# Patient Record
Sex: FEMALE | Race: WHITE | URBAN - METROPOLITAN AREA
[De-identification: names, ages, dates, MRNs, and addresses within clinical notes are randomized per-mention and may not be internally consistent; named-entity substitution may affect disease eponyms.]

---

## 2017-01-07 ENCOUNTER — EMERGENCY (EMERGENCY)
Facility: HOSPITAL | Age: 32
LOS: 1 days | Discharge: HOME | End: 2017-01-07
Admitting: EMERGENCY MEDICINE
Payer: MEDICAID

## 2017-01-07 DIAGNOSIS — O26.92 PREGNANCY RELATED CONDITIONS, UNSPECIFIED, SECOND TRIMESTER: ICD-10-CM

## 2017-01-07 DIAGNOSIS — R10.2 PELVIC AND PERINEAL PAIN: ICD-10-CM

## 2017-01-07 DIAGNOSIS — Z3A.16 16 WEEKS GESTATION OF PREGNANCY: ICD-10-CM

## 2017-01-07 DIAGNOSIS — R10.30 LOWER ABDOMINAL PAIN, UNSPECIFIED: ICD-10-CM

## 2017-01-07 PROCEDURE — 76815 OB US LIMITED FETUS(S): CPT | Mod: 26

## 2017-01-07 PROCEDURE — 99284 EMERGENCY DEPT VISIT MOD MDM: CPT

## 2017-03-22 ENCOUNTER — OUTPATIENT (OUTPATIENT)
Dept: INPATIENT UNIT | Facility: HOSPITAL | Age: 32
LOS: 1 days | Discharge: HOME | End: 2017-03-22
Payer: MEDICAID

## 2017-03-22 VITALS
DIASTOLIC BLOOD PRESSURE: 78 MMHG | HEART RATE: 120 BPM | RESPIRATION RATE: 18 BRPM | OXYGEN SATURATION: 97 % | TEMPERATURE: 99 F | SYSTOLIC BLOOD PRESSURE: 132 MMHG

## 2017-03-22 LAB
ALBUMIN SERPL ELPH-MCNC: 2.7 G/DL — LOW (ref 3.3–5)
ALP SERPL-CCNC: 84 U/L — SIGNIFICANT CHANGE UP (ref 40–120)
ALT FLD-CCNC: 21 U/L — SIGNIFICANT CHANGE UP (ref 12–78)
ANION GAP SERPL CALC-SCNC: 14 MMOL/L — SIGNIFICANT CHANGE UP (ref 5–17)
APPEARANCE UR: CLEAR — SIGNIFICANT CHANGE UP
APPEARANCE UR: CLEAR — SIGNIFICANT CHANGE UP
APTT BLD: 28.2 SEC — SIGNIFICANT CHANGE UP (ref 27.5–37.4)
AST SERPL-CCNC: 17 U/L — SIGNIFICANT CHANGE UP (ref 15–37)
BACTERIA # UR AUTO: (no result)
BACTERIA # UR AUTO: (no result)
BASOPHILS # BLD AUTO: 0.1 K/UL — SIGNIFICANT CHANGE UP (ref 0–0.2)
BASOPHILS # BLD AUTO: 0.1 K/UL — SIGNIFICANT CHANGE UP (ref 0–0.2)
BASOPHILS NFR BLD AUTO: 0.3 % — SIGNIFICANT CHANGE UP (ref 0–2)
BILIRUB SERPL-MCNC: 0.5 MG/DL — SIGNIFICANT CHANGE UP (ref 0.2–1.2)
BILIRUB UR-MCNC: NEGATIVE — SIGNIFICANT CHANGE UP
BILIRUB UR-MCNC: NEGATIVE — SIGNIFICANT CHANGE UP
BLD GP AB SCN SERPL QL: SIGNIFICANT CHANGE UP
BUN SERPL-MCNC: 4 MG/DL — LOW (ref 7–23)
CALCIUM SERPL-MCNC: 8.6 MG/DL — SIGNIFICANT CHANGE UP (ref 8.5–10.1)
CHLORIDE SERPL-SCNC: 110 MMOL/L — HIGH (ref 96–108)
CO2 SERPL-SCNC: 20 MMOL/L — LOW (ref 22–31)
COLOR SPEC: YELLOW — SIGNIFICANT CHANGE UP
COLOR SPEC: YELLOW — SIGNIFICANT CHANGE UP
CREAT SERPL-MCNC: 0.5 MG/DL — SIGNIFICANT CHANGE UP (ref 0.5–1.3)
DIFF PNL FLD: (no result)
DIFF PNL FLD: (no result)
EOSINOPHIL # BLD AUTO: 0 K/UL — SIGNIFICANT CHANGE UP (ref 0–0.5)
EOSINOPHIL # BLD AUTO: 0 K/UL — SIGNIFICANT CHANGE UP (ref 0–0.5)
EOSINOPHIL NFR BLD AUTO: 0.1 % — SIGNIFICANT CHANGE UP (ref 0–6)
EPI CELLS # UR: SIGNIFICANT CHANGE UP
EPI CELLS # UR: SIGNIFICANT CHANGE UP
FIBRINOGEN PPP-MCNC: 630 MG/DL — HIGH (ref 310–510)
GLUCOSE SERPL-MCNC: 97 MG/DL — SIGNIFICANT CHANGE UP (ref 70–99)
GLUCOSE UR QL: NEGATIVE MG/DL — SIGNIFICANT CHANGE UP
GLUCOSE UR QL: NEGATIVE MG/DL — SIGNIFICANT CHANGE UP
HCT VFR BLD CALC: 32.6 % — LOW (ref 34.5–45)
HCT VFR BLD CALC: 36.8 % — SIGNIFICANT CHANGE UP (ref 34.5–45)
HGB BLD-MCNC: 11 G/DL — LOW (ref 11.5–15.5)
HGB BLD-MCNC: 12.4 G/DL — SIGNIFICANT CHANGE UP (ref 11.5–15.5)
INR BLD: 1.04 RATIO — SIGNIFICANT CHANGE UP (ref 0.88–1.16)
KETONES UR-MCNC: (no result)
KETONES UR-MCNC: NEGATIVE — SIGNIFICANT CHANGE UP
LACTATE SERPL-SCNC: 1.3 MMOL/L — SIGNIFICANT CHANGE UP (ref 0.7–2)
LACTATE SERPL-SCNC: 2 MMOL/L — SIGNIFICANT CHANGE UP (ref 0.7–2)
LEUKOCYTE ESTERASE UR-ACNC: (no result)
LEUKOCYTE ESTERASE UR-ACNC: NEGATIVE — SIGNIFICANT CHANGE UP
LYMPHOCYTES # BLD AUTO: 1.5 K/UL — SIGNIFICANT CHANGE UP (ref 1–3.3)
LYMPHOCYTES # BLD AUTO: 1.9 K/UL — SIGNIFICANT CHANGE UP (ref 1–3.3)
LYMPHOCYTES # BLD AUTO: 4 % — LOW (ref 13–44)
LYMPHOCYTES # BLD AUTO: 7.8 % — LOW (ref 13–44)
MANUAL DIF COMMENT BLD-IMP: SIGNIFICANT CHANGE UP
MCHC RBC-ENTMCNC: 28.6 PG — SIGNIFICANT CHANGE UP (ref 27–34)
MCHC RBC-ENTMCNC: 28.7 PG — SIGNIFICANT CHANGE UP (ref 27–34)
MCHC RBC-ENTMCNC: 33.7 GM/DL — SIGNIFICANT CHANGE UP (ref 32–36)
MCHC RBC-ENTMCNC: 33.8 GM/DL — SIGNIFICANT CHANGE UP (ref 32–36)
MCV RBC AUTO: 84.7 FL — SIGNIFICANT CHANGE UP (ref 80–100)
MCV RBC AUTO: 84.9 FL — SIGNIFICANT CHANGE UP (ref 80–100)
MONOCYTES # BLD AUTO: 1.3 K/UL — HIGH (ref 0–0.9)
MONOCYTES # BLD AUTO: 1.9 K/UL — HIGH (ref 0–0.9)
MONOCYTES NFR BLD AUTO: 5.4 % — SIGNIFICANT CHANGE UP (ref 2–14)
MONOCYTES NFR BLD AUTO: 7 % — SIGNIFICANT CHANGE UP (ref 2–14)
NEUTROPHILS # BLD AUTO: 21.5 K/UL — HIGH (ref 1.8–7.4)
NEUTROPHILS # BLD AUTO: 23.9 K/UL — HIGH (ref 1.8–7.4)
NEUTROPHILS NFR BLD AUTO: 75 % — SIGNIFICANT CHANGE UP (ref 43–77)
NEUTROPHILS NFR BLD AUTO: 86.4 % — HIGH (ref 43–77)
NEUTS BAND # BLD: 13 % — HIGH (ref 0–8)
NITRITE UR-MCNC: NEGATIVE — SIGNIFICANT CHANGE UP
NITRITE UR-MCNC: NEGATIVE — SIGNIFICANT CHANGE UP
PH UR: 7 — SIGNIFICANT CHANGE UP (ref 4.8–8)
PH UR: 8 — SIGNIFICANT CHANGE UP (ref 4.8–8)
PLAT MORPH BLD: NORMAL — SIGNIFICANT CHANGE UP
PLAT MORPH BLD: NORMAL — SIGNIFICANT CHANGE UP
PLATELET # BLD AUTO: 243 K/UL — SIGNIFICANT CHANGE UP (ref 150–400)
PLATELET # BLD AUTO: 270 K/UL — SIGNIFICANT CHANGE UP (ref 150–400)
POLYCHROMASIA BLD QL SMEAR: SLIGHT — SIGNIFICANT CHANGE UP
POTASSIUM SERPL-MCNC: 3.3 MMOL/L — LOW (ref 3.5–5.3)
POTASSIUM SERPL-SCNC: 3.3 MMOL/L — LOW (ref 3.5–5.3)
PROT SERPL-MCNC: 6.9 GM/DL — SIGNIFICANT CHANGE UP (ref 6–8.3)
PROT UR-MCNC: NEGATIVE MG/DL — SIGNIFICANT CHANGE UP
PROT UR-MCNC: NEGATIVE MG/DL — SIGNIFICANT CHANGE UP
PROTHROM AB SERPL-ACNC: 11.2 SEC — SIGNIFICANT CHANGE UP (ref 9.8–12.7)
RBC # BLD: 3.84 M/UL — SIGNIFICANT CHANGE UP (ref 3.8–5.2)
RBC # BLD: 4.33 M/UL — SIGNIFICANT CHANGE UP (ref 3.8–5.2)
RBC # FLD: 12.8 % — SIGNIFICANT CHANGE UP (ref 10.3–14.5)
RBC # FLD: 12.9 % — SIGNIFICANT CHANGE UP (ref 10.3–14.5)
RBC BLD AUTO: SIGNIFICANT CHANGE UP
RBC BLD AUTO: SIGNIFICANT CHANGE UP
RBC CASTS # UR COMP ASSIST: (no result) /HPF (ref 0–4)
RBC CASTS # UR COMP ASSIST: (no result) /HPF (ref 0–4)
SODIUM SERPL-SCNC: 144 MMOL/L — SIGNIFICANT CHANGE UP (ref 135–145)
SP GR SPEC: 1 — LOW (ref 1.01–1.02)
SP GR SPEC: 1.01 — SIGNIFICANT CHANGE UP (ref 1.01–1.02)
TSH SERPL-MCNC: 1.39 UIU/ML — SIGNIFICANT CHANGE UP (ref 0.36–3.74)
TYPE + AB SCN PNL BLD: SIGNIFICANT CHANGE UP
UROBILINOGEN FLD QL: NEGATIVE MG/DL — SIGNIFICANT CHANGE UP
UROBILINOGEN FLD QL: NEGATIVE MG/DL — SIGNIFICANT CHANGE UP
VARIANT LYMPHS # BLD: 1 % — SIGNIFICANT CHANGE UP (ref 0–6)
WBC # BLD: 24.9 K/UL — HIGH (ref 3.8–10.5)
WBC # BLD: 27.4 K/UL — HIGH (ref 3.8–10.5)
WBC # FLD AUTO: 24.9 K/UL — HIGH (ref 3.8–10.5)
WBC # FLD AUTO: 27.4 K/UL — HIGH (ref 3.8–10.5)
WBC UR QL: (no result)
WBC UR QL: SIGNIFICANT CHANGE UP

## 2017-03-22 PROCEDURE — 76815 OB US LIMITED FETUS(S): CPT | Mod: 26

## 2017-03-22 RX ORDER — CITRIC ACID/SODIUM CITRATE 300-500 MG
15 SOLUTION, ORAL ORAL EVERY 4 HOURS
Qty: 0 | Refills: 0 | Status: DISCONTINUED | OUTPATIENT
Start: 2017-03-22 | End: 2017-03-22

## 2017-03-22 RX ORDER — SODIUM CHLORIDE 9 MG/ML
500 INJECTION, SOLUTION INTRAVENOUS ONCE
Qty: 0 | Refills: 0 | Status: DISCONTINUED | OUTPATIENT
Start: 2017-03-22 | End: 2017-03-22

## 2017-03-22 RX ORDER — HYDROCORTISONE 1 %
1 OINTMENT (GRAM) TOPICAL EVERY 4 HOURS
Qty: 0 | Refills: 0 | Status: DISCONTINUED | OUTPATIENT
Start: 2017-03-22 | End: 2017-04-06

## 2017-03-22 RX ORDER — AER TRAVELER 0.5 G/1
1 SOLUTION RECTAL; TOPICAL EVERY 4 HOURS
Qty: 0 | Refills: 0 | Status: DISCONTINUED | OUTPATIENT
Start: 2017-03-22 | End: 2017-04-06

## 2017-03-22 RX ORDER — GENTAMICIN SULFATE 40 MG/ML
120 VIAL (ML) INJECTION ONCE
Qty: 0 | Refills: 0 | Status: COMPLETED | OUTPATIENT
Start: 2017-03-22 | End: 2017-03-22

## 2017-03-22 RX ORDER — AMPICILLIN TRIHYDRATE 250 MG
2 CAPSULE ORAL EVERY 6 HOURS
Qty: 0 | Refills: 0 | Status: DISCONTINUED | OUTPATIENT
Start: 2017-03-23 | End: 2017-03-27

## 2017-03-22 RX ORDER — CARBOPROST TROMETHAMINE 250 UG/ML
250 INJECTION, SOLUTION INTRAMUSCULAR ONCE
Qty: 0 | Refills: 0 | Status: COMPLETED | OUTPATIENT
Start: 2017-03-22 | End: 2017-03-22

## 2017-03-22 RX ORDER — DIBUCAINE 1 %
1 OINTMENT (GRAM) RECTAL EVERY 4 HOURS
Qty: 0 | Refills: 0 | Status: DISCONTINUED | OUTPATIENT
Start: 2017-03-22 | End: 2017-04-06

## 2017-03-22 RX ORDER — SODIUM CHLORIDE 9 MG/ML
1000 INJECTION, SOLUTION INTRAVENOUS
Qty: 0 | Refills: 0 | Status: DISCONTINUED | OUTPATIENT
Start: 2017-03-22 | End: 2017-03-23

## 2017-03-22 RX ORDER — GLYCERIN ADULT
1 SUPPOSITORY, RECTAL RECTAL AT BEDTIME
Qty: 0 | Refills: 0 | Status: DISCONTINUED | OUTPATIENT
Start: 2017-03-22 | End: 2017-04-06

## 2017-03-22 RX ORDER — SODIUM CHLORIDE 9 MG/ML
3 INJECTION INTRAMUSCULAR; INTRAVENOUS; SUBCUTANEOUS EVERY 8 HOURS
Qty: 0 | Refills: 0 | Status: DISCONTINUED | OUTPATIENT
Start: 2017-03-22 | End: 2017-04-06

## 2017-03-22 RX ORDER — PRAMOXINE HYDROCHLORIDE 150 MG/15G
1 AEROSOL, FOAM RECTAL EVERY 4 HOURS
Qty: 0 | Refills: 0 | Status: DISCONTINUED | OUTPATIENT
Start: 2017-03-22 | End: 2017-04-06

## 2017-03-22 RX ORDER — LANOLIN
1 OINTMENT (GRAM) TOPICAL EVERY 6 HOURS
Qty: 0 | Refills: 0 | Status: DISCONTINUED | OUTPATIENT
Start: 2017-03-22 | End: 2017-04-06

## 2017-03-22 RX ORDER — MAGNESIUM HYDROXIDE 400 MG/1
30 TABLET, CHEWABLE ORAL
Qty: 0 | Refills: 0 | Status: DISCONTINUED | OUTPATIENT
Start: 2017-03-22 | End: 2017-04-06

## 2017-03-22 RX ORDER — DIPHENHYDRAMINE HCL 50 MG
25 CAPSULE ORAL EVERY 6 HOURS
Qty: 0 | Refills: 0 | Status: DISCONTINUED | OUTPATIENT
Start: 2017-03-22 | End: 2017-04-06

## 2017-03-22 RX ORDER — GENTAMICIN SULFATE 40 MG/ML
80 VIAL (ML) INJECTION EVERY 8 HOURS
Qty: 0 | Refills: 0 | Status: DISCONTINUED | OUTPATIENT
Start: 2017-03-22 | End: 2017-03-27

## 2017-03-22 RX ORDER — BUTORPHANOL TARTRATE 2 MG/ML
2 INJECTION, SOLUTION INTRAMUSCULAR; INTRAVENOUS ONCE
Qty: 0 | Refills: 0 | Status: DISCONTINUED | OUTPATIENT
Start: 2017-03-22 | End: 2017-03-22

## 2017-03-22 RX ORDER — ACETAMINOPHEN 500 MG
975 TABLET ORAL EVERY 6 HOURS
Qty: 0 | Refills: 0 | Status: DISCONTINUED | OUTPATIENT
Start: 2017-03-22 | End: 2017-04-06

## 2017-03-22 RX ORDER — OXYTOCIN 10 UNIT/ML
41.67 VIAL (ML) INJECTION
Qty: 20 | Refills: 0 | Status: DISCONTINUED | OUTPATIENT
Start: 2017-03-22 | End: 2017-04-06

## 2017-03-22 RX ORDER — AMPICILLIN TRIHYDRATE 250 MG
2 CAPSULE ORAL ONCE
Qty: 0 | Refills: 0 | Status: COMPLETED | OUTPATIENT
Start: 2017-03-22 | End: 2017-03-22

## 2017-03-22 RX ORDER — DOCUSATE SODIUM 100 MG
100 CAPSULE ORAL
Qty: 0 | Refills: 0 | Status: DISCONTINUED | OUTPATIENT
Start: 2017-03-22 | End: 2017-04-06

## 2017-03-22 RX ORDER — GENTAMICIN SULFATE 40 MG/ML
VIAL (ML) INJECTION
Qty: 0 | Refills: 0 | Status: DISCONTINUED | OUTPATIENT
Start: 2017-03-22 | End: 2017-03-27

## 2017-03-22 RX ORDER — OXYTOCIN 10 UNIT/ML
333.33 VIAL (ML) INJECTION
Qty: 20 | Refills: 0 | Status: DISCONTINUED | OUTPATIENT
Start: 2017-03-22 | End: 2017-03-23

## 2017-03-22 RX ORDER — AMPICILLIN TRIHYDRATE 250 MG
CAPSULE ORAL
Qty: 0 | Refills: 0 | Status: DISCONTINUED | OUTPATIENT
Start: 2017-03-22 | End: 2017-03-27

## 2017-03-22 RX ORDER — SIMETHICONE 80 MG/1
80 TABLET, CHEWABLE ORAL EVERY 6 HOURS
Qty: 0 | Refills: 0 | Status: DISCONTINUED | OUTPATIENT
Start: 2017-03-22 | End: 2017-04-06

## 2017-03-22 RX ORDER — IBUPROFEN 200 MG
600 TABLET ORAL EVERY 6 HOURS
Qty: 0 | Refills: 0 | Status: DISCONTINUED | OUTPATIENT
Start: 2017-03-22 | End: 2017-04-06

## 2017-03-22 RX ORDER — SODIUM CHLORIDE 9 MG/ML
500 INJECTION INTRAMUSCULAR; INTRAVENOUS; SUBCUTANEOUS
Qty: 0 | Refills: 0 | Status: DISCONTINUED | OUTPATIENT
Start: 2017-03-22 | End: 2017-04-06

## 2017-03-22 RX ADMIN — SODIUM CHLORIDE 2000 MILLILITER(S): 9 INJECTION INTRAMUSCULAR; INTRAVENOUS; SUBCUTANEOUS at 19:04

## 2017-03-22 RX ADMIN — Medication 125 MILLIUNIT(S)/MIN: at 20:47

## 2017-03-22 RX ADMIN — Medication 216 GRAM(S): at 17:53

## 2017-03-22 RX ADMIN — Medication 975 MILLIGRAM(S): at 17:37

## 2017-03-22 RX ADMIN — Medication 125 MILLIUNIT(S)/MIN: at 20:46

## 2017-03-22 RX ADMIN — SODIUM CHLORIDE 125 MILLILITER(S): 9 INJECTION, SOLUTION INTRAVENOUS at 19:01

## 2017-03-22 RX ADMIN — BUTORPHANOL TARTRATE 2 MILLIGRAM(S): 2 INJECTION, SOLUTION INTRAMUSCULAR; INTRAVENOUS at 17:38

## 2017-03-22 RX ADMIN — CARBOPROST TROMETHAMINE 250 MICROGRAM(S): 250 INJECTION, SOLUTION INTRAMUSCULAR at 19:48

## 2017-03-22 RX ADMIN — Medication 200 MILLIGRAM(S): at 18:33

## 2017-03-22 NOTE — PROGRESS NOTE ADULT - SUBJECTIVE AND OBJECTIVE BOX
CODE SEPSIS note  ICU resdient note  31 yo F @26 weeks. presented to  today with fever and fetal demise.  pt was started on IV fluids and IV ampicillin, IV gentamycin pending.  Code sepsis was called by nursing staff due to fever.  pt seen and examined, febrile, c/o some dysuria, 1 episode of vomiting earlier today, 4 episodes of diarrhea, lower abdominal pain.  Physical examination:  T 102.2  /75 RR 12  CV: s1s2nl, no m/r/g  Pulm: CTA B/L  Abd: gravid, lower abdomen tenderness, no rebound or guarding    A/P  Case d/w MD Pina1.  m/p chorioamnionitis in setting of fetal demise. Continue management as per GYN: Ampi+genta and IV fluids  Labs and Bcx, Ucx sent  Not a candidate for ICU admission at the moment    d/w Dr. Benites

## 2017-03-23 VITALS
TEMPERATURE: 98 F | DIASTOLIC BLOOD PRESSURE: 88 MMHG | RESPIRATION RATE: 20 BRPM | SYSTOLIC BLOOD PRESSURE: 126 MMHG | HEART RATE: 86 BPM

## 2017-03-23 LAB
ANISOCYTOSIS BLD QL: SLIGHT — SIGNIFICANT CHANGE UP
AT III ACT/NOR PPP CHRO: 77 % — LOW (ref 85–135)
AT III AG PPP IA-MCNC: 19 MG/DL — LOW (ref 22–39)
CULTURE RESULTS: NO GROWTH — SIGNIFICANT CHANGE UP
DRVVT SCREEN TO CONFIRM RATIO: SIGNIFICANT CHANGE UP
EOSINOPHIL NFR BLD AUTO: 1 % — SIGNIFICANT CHANGE UP (ref 0–6)
HCT VFR BLD CALC: 31.3 % — LOW (ref 34.5–45)
HGB BLD-MCNC: 10.4 G/DL — LOW (ref 11.5–15.5)
LA NT DPL PPP QL: 32.4 SEC — SIGNIFICANT CHANGE UP
LYMPHOCYTES # BLD AUTO: 10 % — LOW (ref 13–44)
MANUAL DIF COMMENT BLD-IMP: SIGNIFICANT CHANGE UP
MCHC RBC-ENTMCNC: 28.5 PG — SIGNIFICANT CHANGE UP (ref 27–34)
MCHC RBC-ENTMCNC: 33.3 GM/DL — SIGNIFICANT CHANGE UP (ref 32–36)
MCV RBC AUTO: 85.5 FL — SIGNIFICANT CHANGE UP (ref 80–100)
MICROCYTES BLD QL: SLIGHT — SIGNIFICANT CHANGE UP
MONOCYTES NFR BLD AUTO: 4 % — SIGNIFICANT CHANGE UP (ref 2–14)
MYELOCYTES NFR BLD: 1 % — HIGH (ref 0–0)
NEUTROPHILS NFR BLD AUTO: 74 % — SIGNIFICANT CHANGE UP (ref 43–77)
NEUTS BAND # BLD: 9 % — HIGH (ref 0–8)
NORMALIZED SCT PPP-RTO: 0.85 RATIO — SIGNIFICANT CHANGE UP (ref 0–1.16)
NORMALIZED SCT PPP-RTO: SIGNIFICANT CHANGE UP
PLAT MORPH BLD: NORMAL — SIGNIFICANT CHANGE UP
PLATELET # BLD AUTO: 250 K/UL — SIGNIFICANT CHANGE UP (ref 150–400)
POIKILOCYTOSIS BLD QL AUTO: SLIGHT — SIGNIFICANT CHANGE UP
POLYCHROMASIA BLD QL SMEAR: SLIGHT — SIGNIFICANT CHANGE UP
PROT C ACT/NOR PPP: 80 % — SIGNIFICANT CHANGE UP (ref 74–150)
PROT S FREE AG PPP IA-ACNC: 21 % — LOW (ref 61–131)
RBC # BLD: 3.66 M/UL — LOW (ref 3.8–5.2)
RBC # FLD: 12.9 % — SIGNIFICANT CHANGE UP (ref 10.3–14.5)
RBC BLD AUTO: (no result)
SPECIMEN SOURCE: SIGNIFICANT CHANGE UP
T PALLIDUM AB TITR SER: NEGATIVE — SIGNIFICANT CHANGE UP
T4 AB SER-ACNC: 10.9 UG/DL — SIGNIFICANT CHANGE UP (ref 4.6–12)
VARIANT LYMPHS # BLD: 1 % — SIGNIFICANT CHANGE UP (ref 0–6)
WBC # BLD: 29 K/UL — HIGH (ref 3.8–10.5)
WBC # FLD AUTO: 29 K/UL — HIGH (ref 3.8–10.5)

## 2017-03-23 RX ADMIN — Medication 216 GRAM(S): at 11:50

## 2017-03-23 RX ADMIN — Medication 200 MILLIGRAM(S): at 07:00

## 2017-03-23 RX ADMIN — Medication 200 MILLIGRAM(S): at 14:15

## 2017-03-23 RX ADMIN — Medication 216 GRAM(S): at 06:23

## 2017-03-23 NOTE — DISCHARGE NOTE OB - HOSPITAL COURSE
33 yo  at 26 weeks gestation presented at her OB's office c/o back pain and malaise.  Pt was found to be febrile, with left CVAT, and no fetal cardiac activity was found. 33 yo  at 26 weeks gestation presented at her OB's office c/o back pain and malaise.  Pt was found to be febrile, with left CVAT, and no fetal cardiac activity was found.  Pt was sent to Labor and Delivery with plan to treat infection and to induce labor.  Pt was noted on L+D to be dilated to 4cm already with bulging membranes.  Pt progressed into spontaneous labor.  Pt also underwent full evaluation for sepsis due to tachycardia, fever upto 102.7, leukocytosis of 24K.  Subsequently, pt delivered vaginally to a normal appearing 26 week fetus, nonmacerated, nonmalodorus, with normal appearing placenta, devoid of abnormalities.  The cord was noted to be tightly wrapped around the fetal neck and multiple times around the body.  The fetus appeared grossly normal with no malformations.  Pt continued on ampicillin and gentamicin from intrapartum to postpartum period.    Pt remained afebrile after delivery into postpartum day 1.  Counseling and support was given.  Social work consultation was performed.  Pt was desirous of discharge home on postpartum day 1, acknowledging her need to continue on her antibiotics at home.

## 2017-03-23 NOTE — DISCHARGE NOTE OB - MEDICATION SUMMARY - MEDICATIONS TO STOP TAKING
I will STOP taking the medications listed below when I get home from the hospital:    amoxicillin-clavulanate 875 mg-125 mg oral tablet  -- 1 tab(s) by mouth 2 times a day  -- Finish all this medication unless otherwise directed by prescriber.  Take with food or milk.

## 2017-03-23 NOTE — DISCHARGE NOTE OB - MEDICATION SUMMARY - MEDICATIONS TO TAKE
I will START or STAY ON the medications listed below when I get home from the hospital:    amoxicillin-clavulanate 875 mg-125 mg oral tablet  -- 1 tab(s) by mouth 2 times a day  -- Finish all this medication unless otherwise directed by prescriber.  Take with food or milk.    -- Indication: For pyelonephritis

## 2017-03-23 NOTE — DISCHARGE NOTE OB - PATIENT PORTAL LINK FT
“You can access the FollowHealth Patient Portal, offered by [facility], by registering with the following website: [email or web address]”

## 2017-03-23 NOTE — DISCHARGE NOTE OB - CARE PLAN
Principal Discharge DX:	Fetal demise, greater than 22 weeks, antepartum, fetus 1  Goal:	recovery  Instructions for follow-up, activity and diet:	pelvic rest for six weeks.  Activity as tolerated.  Regular diet.

## 2017-03-23 NOTE — DISCHARGE NOTE OB - CARE PROVIDER_API CALL
Javier Tiwari (DO), Obstetrics and Gynecology  [address] [place] [address]  Phone: [phone]  Fax: [phone]

## 2017-03-23 NOTE — PERINATAL/NEONATAL BEREAVEMENT NOTE - NS PERI BEREAVE CONSENT PATHO
COPY WITH BABY IN [facility], MOTHERS CHART AND COPY NURSING ADMINISTRATION/no

## 2017-03-24 LAB
ANA PAT FLD IF-IMP: (no result)
ANA TITR SER: (no result)
B2 GLYCOPROT1 AB SER QL: NEGATIVE — SIGNIFICANT CHANGE UP
CULTURE RESULTS: SIGNIFICANT CHANGE UP
SPECIMEN SOURCE: SIGNIFICANT CHANGE UP

## 2017-03-26 ENCOUNTER — RESULT REVIEW (OUTPATIENT)
Age: 32
End: 2017-03-26

## 2017-03-27 PROCEDURE — 88307 TISSUE EXAM BY PATHOLOGIST: CPT | Mod: 26

## 2017-03-28 LAB
CULTURE RESULTS: SIGNIFICANT CHANGE UP
CULTURE RESULTS: SIGNIFICANT CHANGE UP
SPECIMEN SOURCE: SIGNIFICANT CHANGE UP
SPECIMEN SOURCE: SIGNIFICANT CHANGE UP

## 2017-03-29 DIAGNOSIS — O36.4XX0 MATERNAL CARE FOR INTRAUTERINE DEATH, NOT APPLICABLE OR UNSPECIFIED: ICD-10-CM

## 2017-03-30 LAB — SURGICAL PATHOLOGY STUDY: SIGNIFICANT CHANGE UP

## 2017-04-04 ENCOUNTER — EMERGENCY (EMERGENCY)
Facility: HOSPITAL | Age: 32
LOS: 0 days | Discharge: HOME | End: 2017-04-04
Attending: EMERGENCY MEDICINE | Admitting: EMERGENCY MEDICINE
Payer: MEDICAID

## 2017-04-04 VITALS
DIASTOLIC BLOOD PRESSURE: 72 MMHG | TEMPERATURE: 98 F | SYSTOLIC BLOOD PRESSURE: 104 MMHG | HEART RATE: 69 BPM | RESPIRATION RATE: 18 BRPM | OXYGEN SATURATION: 99 %

## 2017-04-04 VITALS
DIASTOLIC BLOOD PRESSURE: 97 MMHG | TEMPERATURE: 98 F | HEART RATE: 77 BPM | OXYGEN SATURATION: 100 % | RESPIRATION RATE: 16 BRPM | SYSTOLIC BLOOD PRESSURE: 142 MMHG

## 2017-04-04 LAB
ALBUMIN SERPL ELPH-MCNC: 3.4 G/DL — SIGNIFICANT CHANGE UP (ref 3.3–5)
ALP SERPL-CCNC: 87 U/L — SIGNIFICANT CHANGE UP (ref 40–120)
ALT FLD-CCNC: 23 U/L — SIGNIFICANT CHANGE UP (ref 12–78)
ANION GAP SERPL CALC-SCNC: 10 MMOL/L — SIGNIFICANT CHANGE UP (ref 5–17)
APPEARANCE UR: CLEAR — SIGNIFICANT CHANGE UP
AST SERPL-CCNC: 16 U/L — SIGNIFICANT CHANGE UP (ref 15–37)
BACTERIA # UR AUTO: (no result)
BASOPHILS # BLD AUTO: 0.1 K/UL — SIGNIFICANT CHANGE UP (ref 0–0.2)
BASOPHILS NFR BLD AUTO: 1 % — SIGNIFICANT CHANGE UP (ref 0–2)
BILIRUB SERPL-MCNC: 0.1 MG/DL — LOW (ref 0.2–1.2)
BILIRUB UR-MCNC: NEGATIVE — SIGNIFICANT CHANGE UP
BUN SERPL-MCNC: 14 MG/DL — SIGNIFICANT CHANGE UP (ref 7–23)
CALCIUM SERPL-MCNC: 9 MG/DL — SIGNIFICANT CHANGE UP (ref 8.5–10.1)
CHLORIDE SERPL-SCNC: 106 MMOL/L — SIGNIFICANT CHANGE UP (ref 96–108)
CO2 SERPL-SCNC: 25 MMOL/L — SIGNIFICANT CHANGE UP (ref 22–31)
COLOR SPEC: YELLOW — SIGNIFICANT CHANGE UP
CREAT SERPL-MCNC: 0.78 MG/DL — SIGNIFICANT CHANGE UP (ref 0.5–1.3)
DIFF PNL FLD: (no result)
EOSINOPHIL # BLD AUTO: 0.3 K/UL — SIGNIFICANT CHANGE UP (ref 0–0.5)
EOSINOPHIL NFR BLD AUTO: 3 % — SIGNIFICANT CHANGE UP (ref 0–6)
EPI CELLS # UR: SIGNIFICANT CHANGE UP
GLUCOSE SERPL-MCNC: 90 MG/DL — SIGNIFICANT CHANGE UP (ref 70–99)
GLUCOSE UR QL: NEGATIVE MG/DL — SIGNIFICANT CHANGE UP
HCT VFR BLD CALC: 39.7 % — SIGNIFICANT CHANGE UP (ref 34.5–45)
HGB BLD-MCNC: 13.1 G/DL — SIGNIFICANT CHANGE UP (ref 11.5–15.5)
KETONES UR-MCNC: NEGATIVE — SIGNIFICANT CHANGE UP
LEUKOCYTE ESTERASE UR-ACNC: (no result)
LYMPHOCYTES # BLD AUTO: 4.2 K/UL — HIGH (ref 1–3.3)
LYMPHOCYTES # BLD AUTO: 40.6 % — SIGNIFICANT CHANGE UP (ref 13–44)
MCHC RBC-ENTMCNC: 27.9 PG — SIGNIFICANT CHANGE UP (ref 27–34)
MCHC RBC-ENTMCNC: 33.1 GM/DL — SIGNIFICANT CHANGE UP (ref 32–36)
MCV RBC AUTO: 84.3 FL — SIGNIFICANT CHANGE UP (ref 80–100)
MONOCYTES # BLD AUTO: 0.5 K/UL — SIGNIFICANT CHANGE UP (ref 0–0.9)
MONOCYTES NFR BLD AUTO: 5.3 % — SIGNIFICANT CHANGE UP (ref 2–14)
NEUTROPHILS # BLD AUTO: 5.2 K/UL — SIGNIFICANT CHANGE UP (ref 1.8–7.4)
NEUTROPHILS NFR BLD AUTO: 50.2 % — SIGNIFICANT CHANGE UP (ref 43–77)
NITRITE UR-MCNC: NEGATIVE — SIGNIFICANT CHANGE UP
PH UR: 6.5 — SIGNIFICANT CHANGE UP (ref 4.8–8)
PLATELET # BLD AUTO: 413 K/UL — HIGH (ref 150–400)
POTASSIUM SERPL-MCNC: 3.7 MMOL/L — SIGNIFICANT CHANGE UP (ref 3.5–5.3)
POTASSIUM SERPL-SCNC: 3.7 MMOL/L — SIGNIFICANT CHANGE UP (ref 3.5–5.3)
PROT SERPL-MCNC: 7.8 GM/DL — SIGNIFICANT CHANGE UP (ref 6–8.3)
PROT UR-MCNC: 15 MG/DL
RBC # BLD: 4.71 M/UL — SIGNIFICANT CHANGE UP (ref 3.8–5.2)
RBC # FLD: 12.6 % — SIGNIFICANT CHANGE UP (ref 10.3–14.5)
RBC CASTS # UR COMP ASSIST: (no result) /HPF (ref 0–4)
SODIUM SERPL-SCNC: 141 MMOL/L — SIGNIFICANT CHANGE UP (ref 135–145)
SP GR SPEC: 1.01 — SIGNIFICANT CHANGE UP (ref 1.01–1.02)
TROPONIN I SERPL-MCNC: <0.015 NG/ML — SIGNIFICANT CHANGE UP (ref 0.01–0.04)
TROPONIN I SERPL-MCNC: <0.015 NG/ML — SIGNIFICANT CHANGE UP (ref 0.01–0.04)
UROBILINOGEN FLD QL: NEGATIVE MG/DL — SIGNIFICANT CHANGE UP
WBC # BLD: 10.3 K/UL — SIGNIFICANT CHANGE UP (ref 3.8–10.5)
WBC # FLD AUTO: 10.3 K/UL — SIGNIFICANT CHANGE UP (ref 3.8–10.5)
WBC UR QL: (no result)

## 2017-04-04 PROCEDURE — 93010 ELECTROCARDIOGRAM REPORT: CPT

## 2017-04-04 PROCEDURE — 99284 EMERGENCY DEPT VISIT MOD MDM: CPT

## 2017-04-04 PROCEDURE — 71275 CT ANGIOGRAPHY CHEST: CPT | Mod: 26

## 2017-04-04 RX ORDER — SODIUM CHLORIDE 9 MG/ML
1000 INJECTION INTRAMUSCULAR; INTRAVENOUS; SUBCUTANEOUS ONCE
Qty: 0 | Refills: 0 | Status: COMPLETED | OUTPATIENT
Start: 2017-04-04 | End: 2017-04-04

## 2017-04-04 RX ADMIN — SODIUM CHLORIDE 1000 MILLILITER(S): 9 INJECTION INTRAMUSCULAR; INTRAVENOUS; SUBCUTANEOUS at 18:39

## 2017-04-04 NOTE — ED STATDOCS - PROGRESS NOTE DETAILS
32 yr. old female presents to ED with chest pain S/P 15 days post partum onset 3 days ago Reports SOB on exertion. No N/V or abd. pain No recent travel. Seen and examined by attending PMD and oreders were placed. Will F/U with results and re evaluate. Humberto CHONG 32 yr. old female presents to ED with chest pain S/P 15 days post partum onset 3 days ago Reports SOB on exertion. No N/V or abd. pain No recent travel. Seen and examined by attending PMD and orders were placed .Plan; Labs, CT chest, troponin.  Will F/U with results and re evaluate. Humberto CHONG Discussed results using  [id number]. Reevaluated patient at bedside.  Patient feeling much improved.  Discussed the results of all diagnostic testing in ED and copies of all reports given.   An opportunity to ask questions was given.  Discussed the importance of prompt, close medical follow-up.  Patient will return with any changes, concerns or persistent / worsening symptoms.  Understanding of all instructions verbalized. -Velia Knowles PA-C

## 2017-04-04 NOTE — ED STATDOCS - NS ED MD SCRIBE ATTENDING SCRIBE SECTIONS
PROGRESS NOTE/RESULTS/PHYSICAL EXAM/PAST MEDICAL/SURGICAL/SOCIAL HISTORY/DISPOSITION/REVIEW OF SYSTEMS/HISTORY OF PRESENT ILLNESS

## 2017-04-04 NOTE — ED STATDOCS - OBJECTIVE STATEMENT
( Used ID#[id number]) 33 y/o female post partum 15 days after vaginal delivery presents to the ED with  c/o central chest pain that started a few days ago. She states that she has been having SOB on exertion. Pt reports having similar symptoms when she was pregnant. Currently pt has no other complaints and denies abd pain, n/v/d, LE edema and recent travel. PMD. Dr. Garduno.

## 2017-04-04 NOTE — ED STATDOCS - ATTENDING CONTRIBUTION TO CARE
I, Julianne Go MD,  performed the initial face to face bedside interview with this patient regarding history of present illness, review of symptoms and relevant past medical, social and family history.  I completed an independent physical examination.  I was the initial provider who evaluated this patient. I have signed out the follow up of any pending tests (i.e. labs, radiological studies) to the ACP.  I have communicated the patient’s plan of care and disposition with the ACP.  The history, relevant review of systems, past medical and surgical history, medical decision making, and physical examination was documented by the scribe in my presence and I attest to the accuracy of the documentation.

## 2017-04-04 NOTE — ED ADULT TRIAGE NOTE - CHIEF COMPLAINT QUOTE
Pt presents with CP x 1 month. pt devilled a baby 2 weeks ago. pt had CP during pregnancy, and CP never went away. pt also c/o SOB with exertion

## 2017-04-05 DIAGNOSIS — Z90.49 ACQUIRED ABSENCE OF OTHER SPECIFIED PARTS OF DIGESTIVE TRACT: Chronic | ICD-10-CM

## 2017-04-06 DIAGNOSIS — R00.2 PALPITATIONS: ICD-10-CM

## 2017-04-06 DIAGNOSIS — R07.9 CHEST PAIN, UNSPECIFIED: ICD-10-CM

## 2017-06-08 ENCOUNTER — APPOINTMENT (OUTPATIENT)
Dept: ULTRASOUND IMAGING | Facility: CLINIC | Age: 32
End: 2017-06-08

## 2017-06-08 ENCOUNTER — OUTPATIENT (OUTPATIENT)
Dept: OUTPATIENT SERVICES | Facility: HOSPITAL | Age: 32
LOS: 1 days | End: 2017-06-08
Payer: MEDICAID

## 2017-06-08 DIAGNOSIS — Z90.49 ACQUIRED ABSENCE OF OTHER SPECIFIED PARTS OF DIGESTIVE TRACT: Chronic | ICD-10-CM

## 2017-06-08 DIAGNOSIS — Z00.8 ENCOUNTER FOR OTHER GENERAL EXAMINATION: ICD-10-CM

## 2017-06-08 PROCEDURE — 76641 ULTRASOUND BREAST COMPLETE: CPT

## 2017-08-19 ENCOUNTER — EMERGENCY (EMERGENCY)
Facility: HOSPITAL | Age: 32
LOS: 0 days | Discharge: HOME | End: 2017-08-20
Attending: EMERGENCY MEDICINE | Admitting: EMERGENCY MEDICINE
Payer: MEDICAID

## 2017-08-19 VITALS — WEIGHT: 145.06 LBS | HEIGHT: 64 IN

## 2017-08-19 DIAGNOSIS — Z90.49 ACQUIRED ABSENCE OF OTHER SPECIFIED PARTS OF DIGESTIVE TRACT: ICD-10-CM

## 2017-08-19 DIAGNOSIS — R50.9 FEVER, UNSPECIFIED: ICD-10-CM

## 2017-08-19 DIAGNOSIS — B34.9 VIRAL INFECTION, UNSPECIFIED: ICD-10-CM

## 2017-08-19 DIAGNOSIS — Z90.49 ACQUIRED ABSENCE OF OTHER SPECIFIED PARTS OF DIGESTIVE TRACT: Chronic | ICD-10-CM

## 2017-08-19 PROCEDURE — 99284 EMERGENCY DEPT VISIT MOD MDM: CPT | Mod: 25

## 2017-08-19 NOTE — ED ADULT NURSE NOTE - OBJECTIVE STATEMENT
Pt. to the ED BIB  C/O Sore Throat with dry cough since yesterday- Pt. denies CP and SOB- Pt. states she is 6 weeks pregnant and was told by GYN to come to the ED for further evaluation- Pt. states she was febrile at 102.0 at home, but took Tylenol at 1500pm today- Hx. of HTN and Ruptured membranes from prior pregnancy and delivered at 26 weeks- Will cont to monitor pt. closely- Safety maintained

## 2017-08-19 NOTE — ED ADULT NURSE NOTE - CHPI ED SYMPTOMS NEG
no nausea/no loss of consciousness/no blurred vision/no weakness/no numbness/no vomiting/no change in level of consciousness

## 2017-08-20 VITALS
RESPIRATION RATE: 16 BRPM | TEMPERATURE: 101 F | OXYGEN SATURATION: 99 % | DIASTOLIC BLOOD PRESSURE: 64 MMHG | HEART RATE: 105 BPM | SYSTOLIC BLOOD PRESSURE: 118 MMHG

## 2017-08-20 LAB — S PYO AG SPEC QL IA: NEGATIVE — SIGNIFICANT CHANGE UP

## 2017-08-20 RX ORDER — ACETAMINOPHEN 500 MG
650 TABLET ORAL ONCE
Qty: 0 | Refills: 0 | Status: COMPLETED | OUTPATIENT
Start: 2017-08-20 | End: 2017-08-20

## 2017-08-20 RX ADMIN — Medication 650 MILLIGRAM(S): at 01:06

## 2017-08-20 NOTE — ED ADULT NURSE REASSESSMENT NOTE - NS ED NURSE REASSESS COMMENT FT1
Pt. D/C as ordered. No physical distress noted at this time- Low grade temp of 100.8 orally noted, Dr. Irby made aware prior to D/C, Pt. medicated with Tylenol 650mg PO as ordered prior to D/C- Pt. and  educated by RN about worsening of symptoms, any high fevers and to return immediately to the ED- Pt. verbalizes understanding of D/C, F/U and worsening of symptoms- Safety maintained

## 2017-08-22 LAB
CULTURE RESULTS: SIGNIFICANT CHANGE UP
SPECIMEN SOURCE: SIGNIFICANT CHANGE UP

## 2017-09-25 PROBLEM — Z00.00 ENCOUNTER FOR PREVENTIVE HEALTH EXAMINATION: Noted: 2017-09-25

## 2017-09-26 ENCOUNTER — ASOB RESULT (OUTPATIENT)
Age: 32
End: 2017-09-26

## 2017-09-26 ENCOUNTER — APPOINTMENT (OUTPATIENT)
Dept: ANTEPARTUM | Facility: CLINIC | Age: 32
End: 2017-09-26
Payer: MEDICAID

## 2017-09-26 PROCEDURE — 76813 OB US NUCHAL MEAS 1 GEST: CPT

## 2017-10-31 ENCOUNTER — EMERGENCY (EMERGENCY)
Facility: HOSPITAL | Age: 32
LOS: 0 days | Discharge: HOME | End: 2017-11-01
Attending: EMERGENCY MEDICINE | Admitting: EMERGENCY MEDICINE
Payer: MEDICAID

## 2017-10-31 VITALS
TEMPERATURE: 98 F | RESPIRATION RATE: 18 BRPM | WEIGHT: 160.06 LBS | OXYGEN SATURATION: 98 % | SYSTOLIC BLOOD PRESSURE: 143 MMHG | HEIGHT: 60 IN | DIASTOLIC BLOOD PRESSURE: 75 MMHG | HEART RATE: 78 BPM

## 2017-10-31 DIAGNOSIS — Z90.49 ACQUIRED ABSENCE OF OTHER SPECIFIED PARTS OF DIGESTIVE TRACT: Chronic | ICD-10-CM

## 2017-10-31 PROCEDURE — 99284 EMERGENCY DEPT VISIT MOD MDM: CPT | Mod: 25

## 2017-10-31 RX ORDER — ACETAMINOPHEN 500 MG
1000 TABLET ORAL ONCE
Qty: 0 | Refills: 0 | Status: COMPLETED | OUTPATIENT
Start: 2017-10-31 | End: 2017-11-01

## 2017-10-31 RX ORDER — SODIUM CHLORIDE 9 MG/ML
1000 INJECTION INTRAMUSCULAR; INTRAVENOUS; SUBCUTANEOUS ONCE
Qty: 0 | Refills: 0 | Status: COMPLETED | OUTPATIENT
Start: 2017-10-31 | End: 2017-10-31

## 2017-10-31 NOTE — ED PROVIDER NOTE - MUSCULOSKELETAL, MLM
No pain palpation back.  Straight leg raise with pain in back, but no pain radiating down legs.  Distal n/v/m intact legs

## 2017-10-31 NOTE — ED PROVIDER NOTE - OBJECTIVE STATEMENT
33 yo pt 17 weeks preg presents for abdominal pain.  Chadian intepretor [id number] used.  33 yo pt 17 weeks preg presents for abdominal pain.  Bahraini intepretor [id number] used.  here with .  pt started to have lower left abd pain tonight.  Pt also with pain to back and pain down both legs.  No trauma.  pt had some vaginal bleeding 5 days ago and follow up with ob, had us that showed low placenta.  No travel, no vomit.  Nothing makes pain better or worse.

## 2017-10-31 NOTE — ED PROVIDER NOTE - CARE PLAN
Principal Discharge DX:	Abdominal pain during pregnancy in second trimester  Secondary Diagnosis:	Low back pain without sciatica, unspecified back pain laterality, unspecified chronicity

## 2017-11-01 LAB
ALBUMIN SERPL ELPH-MCNC: 2.9 G/DL — LOW (ref 3.3–5)
ALP SERPL-CCNC: 65 U/L — SIGNIFICANT CHANGE UP (ref 40–120)
ALT FLD-CCNC: 26 U/L — SIGNIFICANT CHANGE UP (ref 12–78)
ANION GAP SERPL CALC-SCNC: 6 MMOL/L — SIGNIFICANT CHANGE UP (ref 5–17)
APPEARANCE UR: CLEAR — SIGNIFICANT CHANGE UP
APTT BLD: 29.9 SEC — SIGNIFICANT CHANGE UP (ref 27.5–37.4)
AST SERPL-CCNC: 13 U/L — LOW (ref 15–37)
BACTERIA # UR AUTO: (no result)
BASOPHILS # BLD AUTO: 0.1 K/UL — SIGNIFICANT CHANGE UP (ref 0–0.2)
BASOPHILS NFR BLD AUTO: 0.5 % — SIGNIFICANT CHANGE UP (ref 0–2)
BILIRUB SERPL-MCNC: 0.2 MG/DL — SIGNIFICANT CHANGE UP (ref 0.2–1.2)
BILIRUB UR-MCNC: NEGATIVE — SIGNIFICANT CHANGE UP
BLD GP AB SCN SERPL QL: SIGNIFICANT CHANGE UP
BUN SERPL-MCNC: 6 MG/DL — LOW (ref 7–23)
CALCIUM SERPL-MCNC: 8.8 MG/DL — SIGNIFICANT CHANGE UP (ref 8.5–10.1)
CHLORIDE SERPL-SCNC: 107 MMOL/L — SIGNIFICANT CHANGE UP (ref 96–108)
CO2 SERPL-SCNC: 23 MMOL/L — SIGNIFICANT CHANGE UP (ref 22–31)
COLOR SPEC: YELLOW — SIGNIFICANT CHANGE UP
CREAT SERPL-MCNC: 0.51 MG/DL — SIGNIFICANT CHANGE UP (ref 0.5–1.3)
DIFF PNL FLD: (no result)
EOSINOPHIL # BLD AUTO: 0.2 K/UL — SIGNIFICANT CHANGE UP (ref 0–0.5)
EOSINOPHIL NFR BLD AUTO: 1 % — SIGNIFICANT CHANGE UP (ref 0–6)
EPI CELLS # UR: (no result)
GLUCOSE SERPL-MCNC: 93 MG/DL — SIGNIFICANT CHANGE UP (ref 70–99)
GLUCOSE UR QL: NEGATIVE MG/DL — SIGNIFICANT CHANGE UP
HCG SERPL-ACNC: SIGNIFICANT CHANGE UP MIU/ML
HCT VFR BLD CALC: 33.9 % — LOW (ref 34.5–45)
HGB BLD-MCNC: 11.4 G/DL — LOW (ref 11.5–15.5)
INR BLD: 0.95 RATIO — SIGNIFICANT CHANGE UP (ref 0.88–1.16)
KETONES UR-MCNC: NEGATIVE — SIGNIFICANT CHANGE UP
LEUKOCYTE ESTERASE UR-ACNC: NEGATIVE — SIGNIFICANT CHANGE UP
LIDOCAIN IGE QN: 156 U/L — SIGNIFICANT CHANGE UP (ref 73–393)
LYMPHOCYTES # BLD AUTO: 23.7 % — SIGNIFICANT CHANGE UP (ref 13–44)
LYMPHOCYTES # BLD AUTO: 3.6 K/UL — HIGH (ref 1–3.3)
MCHC RBC-ENTMCNC: 28.5 PG — SIGNIFICANT CHANGE UP (ref 27–34)
MCHC RBC-ENTMCNC: 33.7 GM/DL — SIGNIFICANT CHANGE UP (ref 32–36)
MCV RBC AUTO: 84.7 FL — SIGNIFICANT CHANGE UP (ref 80–100)
MONOCYTES # BLD AUTO: 0.8 K/UL — SIGNIFICANT CHANGE UP (ref 0–0.9)
MONOCYTES NFR BLD AUTO: 5.2 % — SIGNIFICANT CHANGE UP (ref 2–14)
NEUTROPHILS # BLD AUTO: 10.5 K/UL — HIGH (ref 1.8–7.4)
NEUTROPHILS NFR BLD AUTO: 69.7 % — SIGNIFICANT CHANGE UP (ref 43–77)
NITRITE UR-MCNC: NEGATIVE — SIGNIFICANT CHANGE UP
PH UR: 7 — SIGNIFICANT CHANGE UP (ref 5–8)
PLATELET # BLD AUTO: 279 K/UL — SIGNIFICANT CHANGE UP (ref 150–400)
POTASSIUM SERPL-MCNC: 3.6 MMOL/L — SIGNIFICANT CHANGE UP (ref 3.5–5.3)
POTASSIUM SERPL-SCNC: 3.6 MMOL/L — SIGNIFICANT CHANGE UP (ref 3.5–5.3)
PROT SERPL-MCNC: 7.5 GM/DL — SIGNIFICANT CHANGE UP (ref 6–8.3)
PROT UR-MCNC: NEGATIVE MG/DL — SIGNIFICANT CHANGE UP
PROTHROM AB SERPL-ACNC: 10.3 SEC — SIGNIFICANT CHANGE UP (ref 9.8–12.7)
RBC # BLD: 4.01 M/UL — SIGNIFICANT CHANGE UP (ref 3.8–5.2)
RBC # FLD: 12.4 % — SIGNIFICANT CHANGE UP (ref 10.3–14.5)
RBC CASTS # UR COMP ASSIST: SIGNIFICANT CHANGE UP /HPF (ref 0–4)
SODIUM SERPL-SCNC: 136 MMOL/L — SIGNIFICANT CHANGE UP (ref 135–145)
SP GR SPEC: 1.01 — SIGNIFICANT CHANGE UP (ref 1.01–1.02)
TYPE + AB SCN PNL BLD: SIGNIFICANT CHANGE UP
UROBILINOGEN FLD QL: NEGATIVE MG/DL — SIGNIFICANT CHANGE UP
WBC # BLD: 15.1 K/UL — HIGH (ref 3.8–10.5)
WBC # FLD AUTO: 15.1 K/UL — HIGH (ref 3.8–10.5)
WBC UR QL: SIGNIFICANT CHANGE UP

## 2017-11-01 PROCEDURE — 76805 OB US >/= 14 WKS SNGL FETUS: CPT | Mod: 26

## 2017-11-01 RX ADMIN — SODIUM CHLORIDE 1000 MILLILITER(S): 9 INJECTION INTRAMUSCULAR; INTRAVENOUS; SUBCUTANEOUS at 00:25

## 2017-11-01 RX ADMIN — Medication 1000 MILLIGRAM(S): at 00:25

## 2017-11-01 NOTE — ED ADULT NURSE NOTE - OBJECTIVE STATEMENT
Pt presents to ER 17 weeks pregnant c/o LLQ pain and back pain. Pt reports onset of symptoms was yesterday and symptoms have been intermittent. Pt reports painless vaginal bleed 5 days ago and followed up with OB; determined low placenta. Pt denies n/v/d. Pt denies trauma/fall. AO x 3 oriented to baseline, normal breathing pattern with no difficulty.

## 2017-11-02 DIAGNOSIS — O26.892 OTHER SPECIFIED PREGNANCY RELATED CONDITIONS, SECOND TRIMESTER: ICD-10-CM

## 2017-11-02 DIAGNOSIS — Z3A.17 17 WEEKS GESTATION OF PREGNANCY: ICD-10-CM

## 2017-11-02 DIAGNOSIS — M54.5 LOW BACK PAIN: ICD-10-CM

## 2017-11-02 DIAGNOSIS — M54.9 DORSALGIA, UNSPECIFIED: ICD-10-CM

## 2017-11-02 DIAGNOSIS — Z90.49 ACQUIRED ABSENCE OF OTHER SPECIFIED PARTS OF DIGESTIVE TRACT: ICD-10-CM

## 2017-11-02 LAB
CULTURE RESULTS: SIGNIFICANT CHANGE UP
SPECIMEN SOURCE: SIGNIFICANT CHANGE UP

## 2017-11-21 ENCOUNTER — APPOINTMENT (OUTPATIENT)
Dept: ANTEPARTUM | Facility: CLINIC | Age: 32
End: 2017-11-21
Payer: MEDICAID

## 2017-11-21 PROCEDURE — 76817 TRANSVAGINAL US OBSTETRIC: CPT

## 2017-11-21 PROCEDURE — 76811 OB US DETAILED SNGL FETUS: CPT

## 2018-02-12 ENCOUNTER — APPOINTMENT (OUTPATIENT)
Dept: ANTEPARTUM | Facility: CLINIC | Age: 33
End: 2018-02-12
Payer: MEDICAID

## 2018-02-12 ENCOUNTER — ASOB RESULT (OUTPATIENT)
Age: 33
End: 2018-02-12

## 2018-02-12 PROCEDURE — 76819 FETAL BIOPHYS PROFIL W/O NST: CPT

## 2018-02-12 PROCEDURE — 76816 OB US FOLLOW-UP PER FETUS: CPT

## 2018-02-20 ENCOUNTER — ASOB RESULT (OUTPATIENT)
Age: 33
End: 2018-02-20

## 2018-02-20 ENCOUNTER — APPOINTMENT (OUTPATIENT)
Dept: ANTEPARTUM | Facility: CLINIC | Age: 33
End: 2018-02-20
Payer: MEDICAID

## 2018-02-20 PROCEDURE — 76816 OB US FOLLOW-UP PER FETUS: CPT

## 2018-03-09 ENCOUNTER — ASOB RESULT (OUTPATIENT)
Age: 33
End: 2018-03-09

## 2018-03-09 ENCOUNTER — APPOINTMENT (OUTPATIENT)
Dept: ANTEPARTUM | Facility: CLINIC | Age: 33
End: 2018-03-09
Payer: MEDICAID

## 2018-03-09 PROCEDURE — 76816 OB US FOLLOW-UP PER FETUS: CPT

## 2018-03-09 PROCEDURE — 76819 FETAL BIOPHYS PROFIL W/O NST: CPT

## 2018-03-16 ENCOUNTER — APPOINTMENT (OUTPATIENT)
Dept: ANTEPARTUM | Facility: CLINIC | Age: 33
End: 2018-03-16
Payer: MEDICAID

## 2018-03-16 ENCOUNTER — ASOB RESULT (OUTPATIENT)
Age: 33
End: 2018-03-16

## 2018-03-16 PROCEDURE — 76815 OB US LIMITED FETUS(S): CPT

## 2018-03-16 PROCEDURE — 76819 FETAL BIOPHYS PROFIL W/O NST: CPT

## 2018-11-23 ENCOUNTER — OUTPATIENT (OUTPATIENT)
Dept: OUTPATIENT SERVICES | Facility: HOSPITAL | Age: 33
LOS: 1 days | End: 2018-11-23
Payer: COMMERCIAL

## 2018-11-23 ENCOUNTER — APPOINTMENT (OUTPATIENT)
Dept: RADIOLOGY | Facility: CLINIC | Age: 33
End: 2018-11-23
Payer: COMMERCIAL

## 2018-11-23 DIAGNOSIS — Z90.49 ACQUIRED ABSENCE OF OTHER SPECIFIED PARTS OF DIGESTIVE TRACT: Chronic | ICD-10-CM

## 2018-11-23 DIAGNOSIS — Z00.8 ENCOUNTER FOR OTHER GENERAL EXAMINATION: ICD-10-CM

## 2018-11-23 PROCEDURE — 72100 X-RAY EXAM L-S SPINE 2/3 VWS: CPT

## 2018-11-23 PROCEDURE — 72100 X-RAY EXAM L-S SPINE 2/3 VWS: CPT | Mod: 26

## 2018-12-05 ENCOUNTER — EMERGENCY (EMERGENCY)
Facility: HOSPITAL | Age: 33
LOS: 0 days | Discharge: HOME | End: 2018-12-05
Attending: EMERGENCY MEDICINE | Admitting: EMERGENCY MEDICINE
Payer: MEDICAID

## 2018-12-05 VITALS
HEART RATE: 68 BPM | OXYGEN SATURATION: 99 % | RESPIRATION RATE: 18 BRPM | SYSTOLIC BLOOD PRESSURE: 160 MMHG | DIASTOLIC BLOOD PRESSURE: 94 MMHG | TEMPERATURE: 99 F

## 2018-12-05 VITALS — WEIGHT: 126.1 LBS | HEIGHT: 64 IN

## 2018-12-05 DIAGNOSIS — J01.90 ACUTE SINUSITIS, UNSPECIFIED: ICD-10-CM

## 2018-12-05 DIAGNOSIS — Z90.49 ACQUIRED ABSENCE OF OTHER SPECIFIED PARTS OF DIGESTIVE TRACT: ICD-10-CM

## 2018-12-05 DIAGNOSIS — R51 HEADACHE: ICD-10-CM

## 2018-12-05 DIAGNOSIS — I10 ESSENTIAL (PRIMARY) HYPERTENSION: ICD-10-CM

## 2018-12-05 DIAGNOSIS — Z90.49 ACQUIRED ABSENCE OF OTHER SPECIFIED PARTS OF DIGESTIVE TRACT: Chronic | ICD-10-CM

## 2018-12-05 PROCEDURE — 99284 EMERGENCY DEPT VISIT MOD MDM: CPT

## 2018-12-05 RX ORDER — DEXAMETHASONE 0.5 MG/5ML
10 ELIXIR ORAL ONCE
Qty: 0 | Refills: 0 | Status: COMPLETED | OUTPATIENT
Start: 2018-12-05 | End: 2018-12-05

## 2018-12-05 RX ORDER — FLUTICASONE PROPIONATE 50 MCG
1 SPRAY, SUSPENSION NASAL ONCE
Qty: 0 | Refills: 0 | Status: COMPLETED | OUTPATIENT
Start: 2018-12-05 | End: 2018-12-05

## 2018-12-05 RX ORDER — IBUPROFEN 200 MG
600 TABLET ORAL ONCE
Qty: 0 | Refills: 0 | Status: COMPLETED | OUTPATIENT
Start: 2018-12-05 | End: 2018-12-05

## 2018-12-05 RX ORDER — DEXAMETHASONE 0.5 MG/5ML
10 ELIXIR ORAL ONCE
Qty: 0 | Refills: 0 | Status: DISCONTINUED | OUTPATIENT
Start: 2018-12-05 | End: 2018-12-05

## 2018-12-05 RX ADMIN — Medication 1 SPRAY(S): at 17:49

## 2018-12-05 RX ADMIN — Medication 600 MILLIGRAM(S): at 17:48

## 2018-12-05 NOTE — ED STATDOCS - PROGRESS NOTE DETAILS
32 yo female with a PMH of HTN presents with R sided headache since yesterday which progressed to R frontal and pain behind the R eye. +nasal discharge of green mucous b/l. Denies f/c/sweating, n/v, cp, sob, abd pain. Probable viral sinusitis. Flonase, decadron, motrin and d/c home with flonase. -Dylan Knowles PA-C

## 2018-12-05 NOTE — ED ADULT NURSE NOTE - CHPI ED NUR SYMPTOMS NEG
no dizziness/no change in level of consciousness/no fever/no loss of consciousness/no vomiting/no nausea/no numbness/no blurred vision/no weakness/no confusion

## 2018-12-05 NOTE — ED STATDOCS - MEDICAL DECISION MAKING DETAILS
34 y/o F with Exam consisted with sinusitis. Given duration of sx only 2 days and afebrile, will treat with steroids, decadron, and intranasal Flonase. Discussed follow up instructions. Pt understands.

## 2018-12-05 NOTE — ED STATDOCS - OBJECTIVE STATEMENT
34 y/o F with PMHx of HTN, on Labetalol, presenting to the ED c/o right-sided HA. Pt speaks Telugu, HPI obtained via DO translating at bedside. Pt reports that yesterday, she started to experience a right-sided frontal HA. Today, pain started to radiate to her right sinus and into the back of her head, causing her to come into ED for evaluation. No hx of similar HA. +nasal congestion, green discharge when blowing nose. +nausea. Denies any fevers, chills, abd pain, vomiting, diarrhea, visual changes, speech changes, CP, SOB, numbness, or weakness. Pt has not missed any doses of her Labetalol. No recent trauma. NKDA.

## 2018-12-05 NOTE — ED STATDOCS - NS_ ATTENDINGSCRIBEDETAILS _ED_A_ED_FT
Roger Lowry DO (Attending): The history, relevant review of systems, past medical and surgical history, medical decision making, and physical examination was documented by the scribe in my presence and I attest to the accuracy of the documentation.

## 2018-12-05 NOTE — ED STATDOCS - ATTENDING CONTRIBUTION TO CARE
I, Roger Lowry DO,  performed the initial face to face bedside interview with this patient regarding history of present illness, review of symptoms and relevant past medical, social and family history.  I completed an independent physical examination.  I was the initial provider who evaluated this patient. I have signed out the follow up of any pending tests (i.e. labs, radiological studies) to the ACP.  I have communicated the patient’s plan of care and disposition with the ACP.

## 2018-12-05 NOTE — ED ADULT NURSE NOTE - CAS EDN DISCHARGE ASSESSMENT
No adverse reaction to first time med in ED/Alert and oriented to person, place and time/Patient baseline mental status

## 2018-12-05 NOTE — ED STATDOCS - ENMT, MLM
Serous effusion to bilateral TM. No erythema. No bulging. Post-nasal drip. Normal tonsils. Tenderness to percussion of right maxillary sinus, reproducing pt's HA.

## 2019-01-11 PROBLEM — I10 ESSENTIAL (PRIMARY) HYPERTENSION: Chronic | Status: ACTIVE | Noted: 2018-12-05

## 2019-01-14 ENCOUNTER — APPOINTMENT (OUTPATIENT)
Age: 34
End: 2019-01-14
Payer: COMMERCIAL

## 2019-01-14 ENCOUNTER — ASOB RESULT (OUTPATIENT)
Age: 34
End: 2019-01-14

## 2019-01-14 PROCEDURE — 76857 US EXAM PELVIC LIMITED: CPT

## 2019-01-14 PROCEDURE — 76830 TRANSVAGINAL US NON-OB: CPT

## 2019-04-25 ENCOUNTER — TRANSCRIPTION ENCOUNTER (OUTPATIENT)
Age: 34
End: 2019-04-25

## 2019-06-24 NOTE — ED PROVIDER NOTE - CPE EDP RESP NORM
[FreeTextEntry1] : Pt here for follow up hypothyroidism and tick bite. \par Also here to f/u on htn.\par c/o difficulty losing wt. She is trying to eat healthier. she is on WW.\par Using ARSLAN mask and getting more restful sleep. [de-identified] : Had tick bite 2 months ago. No symptoms. 6 wk titers for tick borne illness negative. Remains asymptomatic normal...

## 2019-08-13 ENCOUNTER — APPOINTMENT (OUTPATIENT)
Dept: ANTEPARTUM | Facility: CLINIC | Age: 34
End: 2019-08-13
Payer: COMMERCIAL

## 2019-08-13 ENCOUNTER — ASOB RESULT (OUTPATIENT)
Age: 34
End: 2019-08-13

## 2019-08-13 PROCEDURE — 76856 US EXAM PELVIC COMPLETE: CPT | Mod: 59

## 2019-08-13 PROCEDURE — 76830 TRANSVAGINAL US NON-OB: CPT

## 2019-10-07 ENCOUNTER — EMERGENCY (EMERGENCY)
Facility: HOSPITAL | Age: 34
LOS: 0 days | Discharge: HOME | End: 2019-10-08
Attending: EMERGENCY MEDICINE
Payer: COMMERCIAL

## 2019-10-07 VITALS
TEMPERATURE: 98 F | RESPIRATION RATE: 18 BRPM | DIASTOLIC BLOOD PRESSURE: 119 MMHG | HEART RATE: 72 BPM | HEIGHT: 60 IN | SYSTOLIC BLOOD PRESSURE: 183 MMHG | WEIGHT: 147.93 LBS | OXYGEN SATURATION: 100 %

## 2019-10-07 VITALS — SYSTOLIC BLOOD PRESSURE: 160 MMHG | DIASTOLIC BLOOD PRESSURE: 104 MMHG

## 2019-10-07 DIAGNOSIS — R07.9 CHEST PAIN, UNSPECIFIED: ICD-10-CM

## 2019-10-07 DIAGNOSIS — Z90.49 ACQUIRED ABSENCE OF OTHER SPECIFIED PARTS OF DIGESTIVE TRACT: Chronic | ICD-10-CM

## 2019-10-07 DIAGNOSIS — I10 ESSENTIAL (PRIMARY) HYPERTENSION: ICD-10-CM

## 2019-10-07 LAB
ALBUMIN SERPL ELPH-MCNC: 3.7 G/DL — SIGNIFICANT CHANGE UP (ref 3.3–5)
ALP SERPL-CCNC: 54 U/L — SIGNIFICANT CHANGE UP (ref 40–120)
ALT FLD-CCNC: 29 U/L — SIGNIFICANT CHANGE UP (ref 12–78)
ANION GAP SERPL CALC-SCNC: 6 MMOL/L — SIGNIFICANT CHANGE UP (ref 5–17)
APTT BLD: 30.7 SEC — SIGNIFICANT CHANGE UP (ref 27.5–36.3)
AST SERPL-CCNC: 16 U/L — SIGNIFICANT CHANGE UP (ref 15–37)
BILIRUB SERPL-MCNC: 0.3 MG/DL — SIGNIFICANT CHANGE UP (ref 0.2–1.2)
BUN SERPL-MCNC: 11 MG/DL — SIGNIFICANT CHANGE UP (ref 7–23)
CALCIUM SERPL-MCNC: 9.2 MG/DL — SIGNIFICANT CHANGE UP (ref 8.5–10.1)
CHLORIDE SERPL-SCNC: 108 MMOL/L — SIGNIFICANT CHANGE UP (ref 96–108)
CO2 SERPL-SCNC: 26 MMOL/L — SIGNIFICANT CHANGE UP (ref 22–31)
CREAT SERPL-MCNC: 0.61 MG/DL — SIGNIFICANT CHANGE UP (ref 0.5–1.3)
GLUCOSE SERPL-MCNC: 87 MG/DL — SIGNIFICANT CHANGE UP (ref 70–99)
HCT VFR BLD CALC: 38.3 % — SIGNIFICANT CHANGE UP (ref 34.5–45)
HGB BLD-MCNC: 12.7 G/DL — SIGNIFICANT CHANGE UP (ref 11.5–15.5)
INR BLD: 0.98 RATIO — SIGNIFICANT CHANGE UP (ref 0.88–1.16)
MCHC RBC-ENTMCNC: 28.7 PG — SIGNIFICANT CHANGE UP (ref 27–34)
MCHC RBC-ENTMCNC: 33.2 GM/DL — SIGNIFICANT CHANGE UP (ref 32–36)
MCV RBC AUTO: 86.5 FL — SIGNIFICANT CHANGE UP (ref 80–100)
PLATELET # BLD AUTO: 295 K/UL — SIGNIFICANT CHANGE UP (ref 150–400)
POTASSIUM SERPL-MCNC: 3.8 MMOL/L — SIGNIFICANT CHANGE UP (ref 3.5–5.3)
POTASSIUM SERPL-SCNC: 3.8 MMOL/L — SIGNIFICANT CHANGE UP (ref 3.5–5.3)
PROT SERPL-MCNC: 8.1 GM/DL — SIGNIFICANT CHANGE UP (ref 6–8.3)
PROTHROM AB SERPL-ACNC: 10.9 SEC — SIGNIFICANT CHANGE UP (ref 10–12.9)
RBC # BLD: 4.43 M/UL — SIGNIFICANT CHANGE UP (ref 3.8–5.2)
RBC # FLD: 12.6 % — SIGNIFICANT CHANGE UP (ref 10.3–14.5)
SODIUM SERPL-SCNC: 140 MMOL/L — SIGNIFICANT CHANGE UP (ref 135–145)
TROPONIN I SERPL-MCNC: <0.015 NG/ML — SIGNIFICANT CHANGE UP (ref 0.01–0.04)
WBC # BLD: 9.62 K/UL — SIGNIFICANT CHANGE UP (ref 3.8–10.5)
WBC # FLD AUTO: 9.62 K/UL — SIGNIFICANT CHANGE UP (ref 3.8–10.5)

## 2019-10-07 PROCEDURE — 80053 COMPREHEN METABOLIC PANEL: CPT

## 2019-10-07 PROCEDURE — 85730 THROMBOPLASTIN TIME PARTIAL: CPT

## 2019-10-07 PROCEDURE — 71045 X-RAY EXAM CHEST 1 VIEW: CPT

## 2019-10-07 PROCEDURE — 99284 EMERGENCY DEPT VISIT MOD MDM: CPT | Mod: 25

## 2019-10-07 PROCEDURE — 99284 EMERGENCY DEPT VISIT MOD MDM: CPT

## 2019-10-07 PROCEDURE — 36415 COLL VENOUS BLD VENIPUNCTURE: CPT

## 2019-10-07 PROCEDURE — 71045 X-RAY EXAM CHEST 1 VIEW: CPT | Mod: 26

## 2019-10-07 PROCEDURE — 85610 PROTHROMBIN TIME: CPT

## 2019-10-07 PROCEDURE — 93005 ELECTROCARDIOGRAM TRACING: CPT

## 2019-10-07 PROCEDURE — 93010 ELECTROCARDIOGRAM REPORT: CPT | Mod: 76

## 2019-10-07 PROCEDURE — 85027 COMPLETE CBC AUTOMATED: CPT

## 2019-10-07 PROCEDURE — 84484 ASSAY OF TROPONIN QUANT: CPT

## 2019-10-07 NOTE — ED ADULT NURSE NOTE - CHPI ED NUR SYMPTOMS NEG
no vomiting/no diaphoresis/no fever/no shortness of breath/no syncope/no dizziness/no back pain/no chills/no congestion

## 2019-10-07 NOTE — ED ADULT NURSE NOTE - OBJECTIVE STATEMENT
Pt complains of chest pain that began on Saturday. Pt states that she has also had some nausea since Saturday. Pt denies any vomiting. diarrrhea, shortness of breath. Pt denies any activities that exacerbate pain.

## 2019-10-08 LAB — TROPONIN I SERPL-MCNC: <0.015 NG/ML — SIGNIFICANT CHANGE UP (ref 0.01–0.04)

## 2019-10-08 NOTE — ED ADULT NURSE REASSESSMENT NOTE - NS ED NURSE REASSESS COMMENT FT1
PT resting comfortably in bed. Pt awaiting results of second troponin. Plan is for patient to be discharged pending a negative troponin. Comfort and safety maintained. Cardiac monitoring in effect.

## 2019-10-16 NOTE — ED PROVIDER NOTE - PATIENT PORTAL LINK FT
----- Message from Gian Negro sent at 10/16/2019  2:42 PM CDT -----  Contact: self [phone]  Pt states she is returning call from [place].  Please call back at [phone]. Md Jackie   You can access the FollowMyHealth Patient Portal offered by [facility] by registering at the following website: [email or web address]. By joining [employer]’s FollowMyHealth portal, you will also be able to view your health information using other applications (apps) compatible with our system.

## 2019-11-19 ENCOUNTER — ASOB RESULT (OUTPATIENT)
Age: 34
End: 2019-11-19

## 2019-11-19 ENCOUNTER — APPOINTMENT (OUTPATIENT)
Dept: ANTEPARTUM | Facility: CLINIC | Age: 34
End: 2019-11-19
Payer: COMMERCIAL

## 2019-11-19 PROCEDURE — 76830 TRANSVAGINAL US NON-OB: CPT

## 2019-11-19 PROCEDURE — 76857 US EXAM PELVIC LIMITED: CPT | Mod: 59

## 2019-12-09 NOTE — PATIENT PROFILE OB - DOES PATIENT HAVE ADVANCE DIRECTIVE
Yes plan of care explained/side rails up/wait time explained plan of care explained/side rails up/wait time explained

## 2020-07-01 ENCOUNTER — EMERGENCY (EMERGENCY)
Facility: HOSPITAL | Age: 35
LOS: 0 days | Discharge: HOME | End: 2020-07-01
Attending: HOSPITALIST
Payer: COMMERCIAL

## 2020-07-01 ENCOUNTER — TRANSCRIPTION ENCOUNTER (OUTPATIENT)
Age: 35
End: 2020-07-01

## 2020-07-01 VITALS
HEART RATE: 73 BPM | SYSTOLIC BLOOD PRESSURE: 157 MMHG | OXYGEN SATURATION: 98 % | RESPIRATION RATE: 18 BRPM | DIASTOLIC BLOOD PRESSURE: 90 MMHG

## 2020-07-01 VITALS
HEART RATE: 77 BPM | DIASTOLIC BLOOD PRESSURE: 96 MMHG | RESPIRATION RATE: 18 BRPM | WEIGHT: 162.04 LBS | OXYGEN SATURATION: 100 % | TEMPERATURE: 98 F | HEIGHT: 62 IN | SYSTOLIC BLOOD PRESSURE: 151 MMHG
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
## 2020-07-01 DIAGNOSIS — S90.851A SUPERFICIAL FOREIGN BODY, RIGHT FOOT, INITIAL ENCOUNTER: ICD-10-CM

## 2020-07-01 DIAGNOSIS — Z90.49 ACQUIRED ABSENCE OF OTHER SPECIFIED PARTS OF DIGESTIVE TRACT: Chronic | ICD-10-CM
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
## 2020-07-01 LAB
ALBUMIN SERPL ELPH-MCNC: 4 G/DL — SIGNIFICANT CHANGE UP (ref 3.3–5)
ALP SERPL-CCNC: 69 U/L — SIGNIFICANT CHANGE UP (ref 40–120)
ALT FLD-CCNC: 36 U/L — SIGNIFICANT CHANGE UP (ref 12–78)
ANION GAP SERPL CALC-SCNC: 5 MMOL/L — SIGNIFICANT CHANGE UP (ref 5–17)
AST SERPL-CCNC: 20 U/L — SIGNIFICANT CHANGE UP (ref 15–37)
BASOPHILS # BLD AUTO: 0.02 K/UL — SIGNIFICANT CHANGE UP (ref 0–0.2)
BASOPHILS NFR BLD AUTO: 0.2 % — SIGNIFICANT CHANGE UP (ref 0–2)
BILIRUB SERPL-MCNC: 0.4 MG/DL — SIGNIFICANT CHANGE UP (ref 0.2–1.2)
BUN SERPL-MCNC: 10 MG/DL — SIGNIFICANT CHANGE UP (ref 7–23)
CALCIUM SERPL-MCNC: 9.2 MG/DL — SIGNIFICANT CHANGE UP (ref 8.5–10.1)
CHLORIDE SERPL-SCNC: 108 MMOL/L — SIGNIFICANT CHANGE UP (ref 96–108)
CO2 SERPL-SCNC: 25 MMOL/L — SIGNIFICANT CHANGE UP (ref 22–31)
CREAT SERPL-MCNC: 0.73 MG/DL — SIGNIFICANT CHANGE UP (ref 0.5–1.3)
EOSINOPHIL # BLD AUTO: 0.12 K/UL — SIGNIFICANT CHANGE UP (ref 0–0.5)
EOSINOPHIL NFR BLD AUTO: 1.2 % — SIGNIFICANT CHANGE UP (ref 0–6)
GLUCOSE SERPL-MCNC: 104 MG/DL — HIGH (ref 70–99)
HCT VFR BLD CALC: 38.7 % — SIGNIFICANT CHANGE UP (ref 34.5–45)
HGB BLD-MCNC: 13 G/DL — SIGNIFICANT CHANGE UP (ref 11.5–15.5)
IMM GRANULOCYTES NFR BLD AUTO: 1 % — SIGNIFICANT CHANGE UP (ref 0–1.5)
LYMPHOCYTES # BLD AUTO: 3.73 K/UL — HIGH (ref 1–3.3)
LYMPHOCYTES # BLD AUTO: 37.4 % — SIGNIFICANT CHANGE UP (ref 13–44)
MCHC RBC-ENTMCNC: 28.6 PG — SIGNIFICANT CHANGE UP (ref 27–34)
MCHC RBC-ENTMCNC: 33.6 GM/DL — SIGNIFICANT CHANGE UP (ref 32–36)
MCV RBC AUTO: 85.2 FL — SIGNIFICANT CHANGE UP (ref 80–100)
MONOCYTES # BLD AUTO: 0.61 K/UL — SIGNIFICANT CHANGE UP (ref 0–0.9)
MONOCYTES NFR BLD AUTO: 6.1 % — SIGNIFICANT CHANGE UP (ref 2–14)
NEUTROPHILS # BLD AUTO: 5.4 K/UL — SIGNIFICANT CHANGE UP (ref 1.8–7.4)
NEUTROPHILS NFR BLD AUTO: 54.1 % — SIGNIFICANT CHANGE UP (ref 43–77)
PLATELET # BLD AUTO: 331 K/UL — SIGNIFICANT CHANGE UP (ref 150–400)
POTASSIUM SERPL-MCNC: 3.6 MMOL/L — SIGNIFICANT CHANGE UP (ref 3.5–5.3)
POTASSIUM SERPL-SCNC: 3.6 MMOL/L — SIGNIFICANT CHANGE UP (ref 3.5–5.3)
PROT SERPL-MCNC: 8.6 GM/DL — HIGH (ref 6–8.3)
RBC # BLD: 4.54 M/UL — SIGNIFICANT CHANGE UP (ref 3.8–5.2)
RBC # FLD: 13.2 % — SIGNIFICANT CHANGE UP (ref 10.3–14.5)
SODIUM SERPL-SCNC: 138 MMOL/L — SIGNIFICANT CHANGE UP (ref 135–145)
WBC # BLD: 9.98 K/UL — SIGNIFICANT CHANGE UP (ref 3.8–10.5)
WBC # FLD AUTO: 9.98 K/UL — SIGNIFICANT CHANGE UP (ref 3.8–10.5)

## 2020-07-01 PROCEDURE — 73630 X-RAY EXAM OF FOOT: CPT | Mod: 26,RT,77

## 2020-07-01 PROCEDURE — 80053 COMPREHEN METABOLIC PANEL: CPT

## 2020-07-01 PROCEDURE — 73630 X-RAY EXAM OF FOOT: CPT | Mod: RT

## 2020-07-01 PROCEDURE — 99284 EMERGENCY DEPT VISIT MOD MDM: CPT

## 2020-07-01 PROCEDURE — 10120 INC&RMVL FB SUBQ TISS SMPL: CPT

## 2020-07-01 PROCEDURE — 85025 COMPLETE CBC W/AUTO DIFF WBC: CPT

## 2020-07-01 PROCEDURE — 99284 EMERGENCY DEPT VISIT MOD MDM: CPT | Mod: 25

## 2020-07-01 PROCEDURE — 36415 COLL VENOUS BLD VENIPUNCTURE: CPT

## 2020-07-01 PROCEDURE — 73630 X-RAY EXAM OF FOOT: CPT | Mod: 26,RT

## 2020-07-01 RX ADMIN — Medication 1 TABLET(S): at 23:29

## 2020-07-01 NOTE — ED STATDOCS - NS_ ATTENDINGSCRIBEDETAILS _ED_A_ED_FT
Jessica Nguyễn MD: The history, relevant review of systems, past medical and surgical history, medical decision making, and physical examination was documented by the scribe in my presence and I attest to the accuracy of the documentation.

## 2020-07-01 NOTE — ED STATDOCS - NSFOLLOWUPINSTRUCTIONS_ED_ALL_ED_FT
please take antibiotics as prescribed  please follow up with Dr. Angulo in 3 days. Please call [phone]  please use the cane for walking.

## 2020-07-01 NOTE — ED STATDOCS - OBJECTIVE STATEMENT
36 y/o F no PMHx was cleaning at home stepped on needle on carpet, needle went into right heel and snapped in half, remainder of needle remains in foot. pt went for XR at urgent care who sent her to ed for removal.

## 2020-07-01 NOTE — ED ADULT NURSE NOTE - OBJECTIVE STATEMENT
Pt comes in after stepping on needle at home. pt states the needle broke but half remains in her. urgent care sent here. not currently bleeding

## 2020-07-01 NOTE — ED ADULT TRIAGE NOTE - CHIEF COMPLAINT QUOTE
Pt presents to ED c/o right foot injury. Pt reports she went to urgent care for right foot pain, on XRAY, showed py had nail in heel. Difficulty ambulating. Denies fever, drainage from area.

## 2020-07-01 NOTE — ED STATDOCS - PATIENT PORTAL LINK FT
You can access the FollowMyHealth Patient Portal offered by [facility] by registering at the following website: [email or web address]. By joining [employer]’s FollowMyHealth portal, you will also be able to view your health information using other applications (apps) compatible with our system.

## 2020-07-01 NOTE — ED STATDOCS - PROGRESS NOTE DETAILS
Ioana PINEDA for ED attending Dr. Lantigua: Spoke to podiatry who will come to eval pt, requesting blood work. signed Charlotte Corona PA-C Pt seen initially in intake by Dr. Jessica Nguyễn.   Podiatry in ED to see pt.

## 2020-07-01 NOTE — ED ADULT NURSE NOTE - NSIMPLEMENTINTERV_GEN_ALL_ED
Implemented All Universal Safety Interventions:  [place] to call system. Call bell, personal items and telephone within reach. Instruct patient to call for assistance. Room bathroom lighting operational. Non-slip footwear when patient is off stretcher. Physically safe environment: no spills, clutter or unnecessary equipment. Stretcher in lowest position, wheels locked, appropriate side rails in place.

## 2020-07-14 NOTE — ED PROVIDER NOTE - TIMING
Leah Mack discharged to home.   Patient provided with the following educational materials upon discharge:  Asthma action plan, albuterol, prednisone.   Valuables and belongings sent with patient.   discharge summary, discharge instructions, medications and follow up appointments reviewed with patient.  Patient  verbalized understanding.  
gradual onset

## 2020-08-17 NOTE — ED ADULT NURSE NOTE - NS ED NURSE IV DC DT
Date of Surgery Update:  Jamila Russo was seen and examined. History and physical has been reviewed. The patient has been examined.  There have been no significant clinical changes since the completion of the originally dated History and Physical.    Signed By: Elizabeth Becerra MD     August 17, 2020 7:54 AM
04-Apr-2017 22:43

## 2020-10-21 NOTE — ED ADULT NURSE NOTE - NS ED NOTE ABUSE RESPONSE YN
Patient was returned to PCP for medication management and refills at last visit on 7/7/20.  Routing refill request to PCP for review.    Marie Singh RN    Nurse Liaison  [facility]     Yes

## 2020-11-19 NOTE — ED ADULT NURSE NOTE - BMI (KG/M2)
Bed: 04main  Expected date:   Expected time:   Means of arrival:   Comments:  4  
Bed: 20  Expected date:   Expected time:   Means of arrival:   Comments:  #2  
26.6

## 2020-12-08 NOTE — ED PROVIDER NOTE - OBJECTIVE STATEMENT
33 yo female, gravid at 6 weeks, c/o fever and sore throat x 1 days. also with nasal congestion. denies chest pain or shortness of breath. denies vomiting or diarrhea. . denies dysuria or vaginal bleeding. denies headache  took tylenol earlier No

## 2021-01-01 NOTE — ED ADULT TRIAGE NOTE - BMI (KG/M2)
Infant stable on NCPAP with FiO2 needs 21-24%.  She had 1 self resolving heart rate dip with desaturation.  She is tolerating her feedings.  She is voiding and stooling well.   21.6

## 2021-01-24 ENCOUNTER — TRANSCRIPTION ENCOUNTER (OUTPATIENT)
Age: 36
End: 2021-01-24

## 2021-03-27 ENCOUNTER — EMERGENCY (EMERGENCY)
Facility: HOSPITAL | Age: 36
LOS: 0 days | Discharge: HOME | End: 2021-03-27
Attending: EMERGENCY MEDICINE
Payer: COMMERCIAL

## 2021-03-27 VITALS
HEART RATE: 79 BPM | TEMPERATURE: 99 F | SYSTOLIC BLOOD PRESSURE: 142 MMHG | HEIGHT: 62 IN | DIASTOLIC BLOOD PRESSURE: 93 MMHG | RESPIRATION RATE: 18 BRPM | OXYGEN SATURATION: 100 % | WEIGHT: 169.98 LBS

## 2021-03-27 VITALS
OXYGEN SATURATION: 99 % | HEART RATE: 77 BPM | DIASTOLIC BLOOD PRESSURE: 68 MMHG | RESPIRATION RATE: 18 BRPM | TEMPERATURE: 99 F | SYSTOLIC BLOOD PRESSURE: 111 MMHG

## 2021-03-27 DIAGNOSIS — O99.891 OTHER SPECIFIED DISEASES AND CONDITIONS COMPLICATING PREGNANCY: ICD-10-CM

## 2021-03-27 DIAGNOSIS — R10.31 RIGHT LOWER QUADRANT PAIN: ICD-10-CM

## 2021-03-27 DIAGNOSIS — O99.611 DISEASES OF THE DIGESTIVE SYSTEM COMPLICATING PREGNANCY, FIRST TRIMESTER: ICD-10-CM

## 2021-03-27 DIAGNOSIS — K52.9 NONINFECTIVE GASTROENTERITIS AND COLITIS, UNSPECIFIED: ICD-10-CM

## 2021-03-27 DIAGNOSIS — O16.1 UNSPECIFIED MATERNAL HYPERTENSION, FIRST TRIMESTER: ICD-10-CM

## 2021-03-27 DIAGNOSIS — Z90.49 ACQUIRED ABSENCE OF OTHER SPECIFIED PARTS OF DIGESTIVE TRACT: Chronic | ICD-10-CM

## 2021-03-27 DIAGNOSIS — Z20.822 CONTACT WITH AND (SUSPECTED) EXPOSURE TO COVID-19: ICD-10-CM

## 2021-03-27 DIAGNOSIS — Z90.49 ACQUIRED ABSENCE OF OTHER SPECIFIED PARTS OF DIGESTIVE TRACT: ICD-10-CM

## 2021-03-27 DIAGNOSIS — Z3A.09 9 WEEKS GESTATION OF PREGNANCY: ICD-10-CM

## 2021-03-27 DIAGNOSIS — R11.0 NAUSEA: ICD-10-CM

## 2021-03-27 LAB
ALBUMIN SERPL ELPH-MCNC: 3.5 G/DL — SIGNIFICANT CHANGE UP (ref 3.3–5)
ALP SERPL-CCNC: 69 U/L — SIGNIFICANT CHANGE UP (ref 40–120)
ALT FLD-CCNC: 29 U/L — SIGNIFICANT CHANGE UP (ref 12–78)
ANION GAP SERPL CALC-SCNC: 9 MMOL/L — SIGNIFICANT CHANGE UP (ref 5–17)
APPEARANCE UR: CLEAR — SIGNIFICANT CHANGE UP
AST SERPL-CCNC: 14 U/L — LOW (ref 15–37)
BASOPHILS # BLD AUTO: 0.05 K/UL — SIGNIFICANT CHANGE UP (ref 0–0.2)
BASOPHILS NFR BLD AUTO: 0.4 % — SIGNIFICANT CHANGE UP (ref 0–2)
BILIRUB SERPL-MCNC: 0.3 MG/DL — SIGNIFICANT CHANGE UP (ref 0.2–1.2)
BILIRUB UR-MCNC: NEGATIVE — SIGNIFICANT CHANGE UP
BUN SERPL-MCNC: 9 MG/DL — SIGNIFICANT CHANGE UP (ref 7–23)
CALCIUM SERPL-MCNC: 8.8 MG/DL — SIGNIFICANT CHANGE UP (ref 8.5–10.1)
CHLORIDE SERPL-SCNC: 103 MMOL/L — SIGNIFICANT CHANGE UP (ref 96–108)
CO2 SERPL-SCNC: 22 MMOL/L — SIGNIFICANT CHANGE UP (ref 22–31)
COLOR SPEC: YELLOW — SIGNIFICANT CHANGE UP
CREAT SERPL-MCNC: 0.58 MG/DL — SIGNIFICANT CHANGE UP (ref 0.5–1.3)
DIFF PNL FLD: NEGATIVE — SIGNIFICANT CHANGE UP
EOSINOPHIL # BLD AUTO: 0.09 K/UL — SIGNIFICANT CHANGE UP (ref 0–0.5)
EOSINOPHIL NFR BLD AUTO: 0.7 % — SIGNIFICANT CHANGE UP (ref 0–6)
GLUCOSE SERPL-MCNC: 104 MG/DL — HIGH (ref 70–99)
GLUCOSE UR QL: NEGATIVE MG/DL — SIGNIFICANT CHANGE UP
HCG SERPL-ACNC: HIGH MIU/ML
HCT VFR BLD CALC: 35.5 % — SIGNIFICANT CHANGE UP (ref 34.5–45)
HGB BLD-MCNC: 12.1 G/DL — SIGNIFICANT CHANGE UP (ref 11.5–15.5)
IMM GRANULOCYTES NFR BLD AUTO: 1.8 % — HIGH (ref 0–1.5)
KETONES UR-MCNC: ABNORMAL
LACTATE SERPL-SCNC: 1.1 MMOL/L — SIGNIFICANT CHANGE UP (ref 0.7–2)
LEUKOCYTE ESTERASE UR-ACNC: ABNORMAL
LIDOCAIN IGE QN: 114 U/L — SIGNIFICANT CHANGE UP (ref 73–393)
LYMPHOCYTES # BLD AUTO: 19.4 % — SIGNIFICANT CHANGE UP (ref 13–44)
LYMPHOCYTES # BLD AUTO: 2.38 K/UL — SIGNIFICANT CHANGE UP (ref 1–3.3)
MCHC RBC-ENTMCNC: 29.1 PG — SIGNIFICANT CHANGE UP (ref 27–34)
MCHC RBC-ENTMCNC: 34.1 GM/DL — SIGNIFICANT CHANGE UP (ref 32–36)
MCV RBC AUTO: 85.3 FL — SIGNIFICANT CHANGE UP (ref 80–100)
MONOCYTES # BLD AUTO: 0.64 K/UL — SIGNIFICANT CHANGE UP (ref 0–0.9)
MONOCYTES NFR BLD AUTO: 5.2 % — SIGNIFICANT CHANGE UP (ref 2–14)
NEUTROPHILS # BLD AUTO: 8.87 K/UL — HIGH (ref 1.8–7.4)
NEUTROPHILS NFR BLD AUTO: 72.5 % — SIGNIFICANT CHANGE UP (ref 43–77)
NITRITE UR-MCNC: NEGATIVE — SIGNIFICANT CHANGE UP
PH UR: 6.5 — SIGNIFICANT CHANGE UP (ref 5–8)
PLATELET # BLD AUTO: 303 K/UL — SIGNIFICANT CHANGE UP (ref 150–400)
POTASSIUM SERPL-MCNC: 3.7 MMOL/L — SIGNIFICANT CHANGE UP (ref 3.5–5.3)
POTASSIUM SERPL-SCNC: 3.7 MMOL/L — SIGNIFICANT CHANGE UP (ref 3.5–5.3)
PROT SERPL-MCNC: 8.1 GM/DL — SIGNIFICANT CHANGE UP (ref 6–8.3)
PROT UR-MCNC: 30 MG/DL
RBC # BLD: 4.16 M/UL — SIGNIFICANT CHANGE UP (ref 3.8–5.2)
RBC # FLD: 13 % — SIGNIFICANT CHANGE UP (ref 10.3–14.5)
SARS-COV-2 RNA SPEC QL NAA+PROBE: SIGNIFICANT CHANGE UP
SODIUM SERPL-SCNC: 134 MMOL/L — LOW (ref 135–145)
SP GR SPEC: 1.02 — SIGNIFICANT CHANGE UP (ref 1.01–1.02)
UROBILINOGEN FLD QL: NEGATIVE MG/DL — SIGNIFICANT CHANGE UP
WBC # BLD: 12.25 K/UL — HIGH (ref 3.8–10.5)
WBC # FLD AUTO: 12.25 K/UL — HIGH (ref 3.8–10.5)

## 2021-03-27 PROCEDURE — 96375 TX/PRO/DX INJ NEW DRUG ADDON: CPT

## 2021-03-27 PROCEDURE — 87086 URINE CULTURE/COLONY COUNT: CPT

## 2021-03-27 PROCEDURE — 85025 COMPLETE CBC W/AUTO DIFF WBC: CPT

## 2021-03-27 PROCEDURE — 74181 MRI ABDOMEN W/O CONTRAST: CPT

## 2021-03-27 PROCEDURE — 76801 OB US < 14 WKS SINGLE FETUS: CPT

## 2021-03-27 PROCEDURE — 83605 ASSAY OF LACTIC ACID: CPT

## 2021-03-27 PROCEDURE — 72195 MRI PELVIS W/O DYE: CPT

## 2021-03-27 PROCEDURE — 81001 URINALYSIS AUTO W/SCOPE: CPT

## 2021-03-27 PROCEDURE — 74181 MRI ABDOMEN W/O CONTRAST: CPT | Mod: 26

## 2021-03-27 PROCEDURE — 72195 MRI PELVIS W/O DYE: CPT | Mod: 26

## 2021-03-27 PROCEDURE — U0003: CPT

## 2021-03-27 PROCEDURE — 80053 COMPREHEN METABOLIC PANEL: CPT

## 2021-03-27 PROCEDURE — 76801 OB US < 14 WKS SINGLE FETUS: CPT | Mod: 26

## 2021-03-27 PROCEDURE — 96374 THER/PROPH/DIAG INJ IV PUSH: CPT

## 2021-03-27 PROCEDURE — 76770 US EXAM ABDO BACK WALL COMP: CPT | Mod: 26

## 2021-03-27 PROCEDURE — U0005: CPT

## 2021-03-27 PROCEDURE — 99285 EMERGENCY DEPT VISIT HI MDM: CPT

## 2021-03-27 PROCEDURE — 84702 CHORIONIC GONADOTROPIN TEST: CPT

## 2021-03-27 PROCEDURE — 99284 EMERGENCY DEPT VISIT MOD MDM: CPT | Mod: 25

## 2021-03-27 PROCEDURE — 36415 COLL VENOUS BLD VENIPUNCTURE: CPT

## 2021-03-27 PROCEDURE — 76770 US EXAM ABDO BACK WALL COMP: CPT

## 2021-03-27 PROCEDURE — 76705 ECHO EXAM OF ABDOMEN: CPT

## 2021-03-27 PROCEDURE — 83690 ASSAY OF LIPASE: CPT

## 2021-03-27 PROCEDURE — 76705 ECHO EXAM OF ABDOMEN: CPT | Mod: 26

## 2021-03-27 RX ORDER — SODIUM CHLORIDE 9 MG/ML
1000 INJECTION INTRAMUSCULAR; INTRAVENOUS; SUBCUTANEOUS ONCE
Refills: 0 | Status: COMPLETED | OUTPATIENT
Start: 2021-03-27 | End: 2021-03-27

## 2021-03-27 RX ORDER — MORPHINE SULFATE 50 MG/1
4 CAPSULE, EXTENDED RELEASE ORAL ONCE
Refills: 0 | Status: DISCONTINUED | OUTPATIENT
Start: 2021-03-27 | End: 2021-03-27

## 2021-03-27 RX ORDER — ONDANSETRON 8 MG/1
4 TABLET, FILM COATED ORAL ONCE
Refills: 0 | Status: COMPLETED | OUTPATIENT
Start: 2021-03-27 | End: 2021-03-27

## 2021-03-27 RX ORDER — ACETAMINOPHEN 500 MG
1000 TABLET ORAL ONCE
Refills: 0 | Status: COMPLETED | OUTPATIENT
Start: 2021-03-27 | End: 2021-03-27

## 2021-03-27 RX ADMIN — MORPHINE SULFATE 4 MILLIGRAM(S): 50 CAPSULE, EXTENDED RELEASE ORAL at 14:26

## 2021-03-27 RX ADMIN — Medication 1000 MILLIGRAM(S): at 12:20

## 2021-03-27 RX ADMIN — SODIUM CHLORIDE 1000 MILLILITER(S): 9 INJECTION INTRAMUSCULAR; INTRAVENOUS; SUBCUTANEOUS at 12:21

## 2021-03-27 RX ADMIN — ONDANSETRON 4 MILLIGRAM(S): 8 TABLET, FILM COATED ORAL at 12:20

## 2021-03-27 NOTE — ED STATDOCS - NS ED ROS FT
Review of Systems:  	•	CONSTITUTIONAL: no fever  	•	SKIN: no rash  	•	RESPIRATORY: no shortness of breath  	•	CARDIAC: no chest pain  	•	GI:  no nausea, no vomiting, no diarrhea, +RLQ abd pain, +R flank pain  	•	GENITO-URINARY:  no dysuria  	•	MUSCULOSKELETAL:  +RLE pain  	•	NEUROLOGIC: no weakness  	•	ALLERGY: no rhinorrhea  	•	PSYCHIATRIC: appropriate concern about symptoms Review of Systems:  	•	CONSTITUTIONAL: no fever  	•	SKIN: no rash  	•	RESPIRATORY: no shortness of breath  	•	CARDIAC: no chest pain  	•	GI:  nausea, no vomiting, no diarrhea, +RLQ abd pain, +R flank pain  	•	GENITO-URINARY:  no dysuria  	•	MUSCULOSKELETAL:  +RLE pain  	•	NEUROLOGIC: no weakness  	•	ALLERGY: no rhinorrhea  	•	PSYCHIATRIC: appropriate concern about symptoms

## 2021-03-27 NOTE — ED STATDOCS - PATIENT PORTAL LINK FT
You can access the FollowMyHealth Patient Portal offered by [facility] by registering at the following website: [email or web address]. By joining [employer]’s FollowMyHealth portal, you will also be able to view your health information using other applications (apps) compatible with our system.

## 2021-03-27 NOTE — ED STATDOCS - ATTENDING CONTRIBUTION TO CARE
I, Graham Dykes MD,  performed the initial face to face bedside interview with this patient regarding history of present illness, review of symptoms and relevant past medical, social and family history.  I completed an independent physical examination.  I was the initial provider who evaluated this patient. I have signed out the follow up of any pending tests (i.e. labs, radiological studies) to the ACP.  The ACP will complete the work up, interpret tests, administer medications if necessary and reassess the patient for an appropriate disposition.  If questions arise the ACP is expected to contact me for consultation. In the current work-flow I usually don't get to speak to nor reassess patients for final disposition once I sign the case out to the ACP (Unless otherwise specifically documented by me).

## 2021-03-27 NOTE — ED STATDOCS - OBJECTIVE STATEMENT
Pertinent HPI/PMH/PSH/FHx/SHx and Review of Systems contained within  HPI: 37 y/o  presents ambulatory to the ED with CC RLQ and R flank pain radiating down RLE beginning yesterday. Had confirmed IUP. OBGYN: Dr. Salmeron  PMH/PSH relevant for: HTN, miscarriage at 26 weeks gestation (2017), and s/p cholecystectomy  ROS negative for: fever, Chest pain, SOB, Nausea, vomiting, diarrhea, dysuria, hematuria   FamilyHx and SocialHx not otherwise contributory. ALYCIA Logan used as Danish interpretor  HPI: 35 y/o  presents ambulatory to the ED with CC RLQ and R flank pain radiating down RLE beginning yesterday.  also with nausea. is currently 9 weeks pregnanty & Has confirmed IUP with normal  OBGYN visit with Dr. Cross last week  PMH/PSH relevant for: HTN, miscarriage at 26 weeks gestation (2017), and s/p cholecystectomy  ROS negative for: fever, Chest pain, SOB, vomiting, diarrhea, dysuria, hematuria   FamilyHx and SocialHx not otherwise contributory.

## 2021-03-27 NOTE — ED STATDOCS - PROGRESS NOTE DETAILS
35 y/o  presents to the ED c/o RLQ and R flank pain radiating down RLE beginning yesterday. Pt is 10 weeks pregnant, +confirmed IUP with OB Dr. Salmeron, pt has a hx of miscarriage at 26 weeks in 2017 and hx of cholecystectomy.   PE: +RLQ TTP, remainder of abd soft, NTTP, lungs CTA b/l, heart RRR s1/s2  Plan: labs US, transvaginal and renal US, MRI if nondiagnostic   Madhavi Betancur PA-C labs with mild leukocytosis, UA w/o blood, pelvic and renal US with no acute findings, will proceed with MRI to r/o saeid Betancur PA-C MRI with No evidence of acute appendicitis. Mild wall thickening of the ascending colon, raising a question of colitis, will start augmentin (pregnancy category B) and d/c home with outpt f/u with OB and PMD, tylenol prn pain, return precautions discussed, will d/c home pt agreeable to d/c and plan of care   Madhavi Betancur PA-C MRI with No evidence of acute appendicitis. Mild wall thickening of the ascending colon, raising a question of colitis, will start augmentin (pregnancy category B) and d/c home with outpt f/u with OB and PMD, tylenol prn pain, return precautions discussed, will d/c home pt agreeable to d/c and plan of care.   [id number] used.    Madhavi Betancur PA-C

## 2021-03-27 NOTE — ED ADULT NURSE NOTE - OBJECTIVE STATEMENT
35 y/o F presents c/o right lower quadrant pain radiating to the flank and right leg. Pt states she is 10 weeks pregnant.

## 2021-03-27 NOTE — ED STATDOCS - CARE PLAN
Principal Discharge DX:	Colitis   Principal Discharge DX:	Colitis  Secondary Diagnosis:	Abdominal pain in pregnancy

## 2021-03-27 NOTE — ED STATDOCS - PHYSICAL EXAMINATION
*GEN: No acute distress, well appearing   *HEAD: Normocephalic, Atraumatic  *EYES/NOSE: b/l Pupils symmetric & Reactive to light, EOMI b/l  *THROAT: airway patent, moist mucous membranes  *NECK: Neck supple  *PULMONARY: No Respiratory distress, symmetric b/l chest rise  *CARDIAC: s1s2, regular rhythm   *ABDOMEN:  +TTP RLQ   *BACK: no CVA tenderness, No midline vertebral tenderness to palpation   *EXTREMITIES: symmetric pulses, 2+ DP & radial pulses, no cyanosis, no edema   *SKIN: no rash, no bruising   *NEUROLOGIC: alert,  full active & passive ROM in all 4 extremities,   *PSYCH: appropriate concern about symptoms, pleasant *GEN: No acute distress, well appearing   *HEAD: Normocephalic, Atraumatic  *EYES/NOSE: b/l Pupils symmetric & Reactive to light, EOMI b/l  *THROAT: airway patent, moist mucous membranes  *NECK: Neck supple  *PULMONARY: No Respiratory distress, symmetric b/l chest rise  *CARDIAC: s1s2, regular rhythm   *ABDOMEN:  +TTP RLQ & R mid quadrant,   *BACK: no CVA tenderness, No midline vertebral tenderness to palpation   *EXTREMITIES: symmetric pulses, 2+ DP & radial pulses, no cyanosis, no edema   *SKIN: no rash, no bruising   *NEUROLOGIC: alert,  full active & passive ROM in all 4 extremities, normal gait  *PSYCH: appropriate concern about symptoms, pleasant

## 2021-03-27 NOTE — ED ADULT TRIAGE NOTE - CHIEF COMPLAINT QUOTE
c/o right abdominal pain and right sided back pain started yesterday, denies fever, diarrhea, vomiting or vaginal bleeding, patient is 10 weeks pregnant HX: HTN

## 2021-03-27 NOTE — ED STATDOCS - NSFOLLOWUPINSTRUCTIONS_ED_ALL_ED_FT
Colitis    Colitis       La colitis es la inflamación del colon. La colitis puede durar un breve período (ser aguda) o prolongarse mucho tiempo (volverse crónica).      ¿Cuáles son las causas?  Esta afección puede ser causada por lo siguiente:  •Virus.      •Bacterias.      •Reacciones a los medicamentos.      •Algunas enfermedades autoinmunitarias, parker la enfermedad de Crohn y la colitis ulcerosa.      •Radioterapia.      •Disminución de la irrigación sanguínea al intestino (isquemia).        ¿Cuáles son los signos o los síntomas?  Los síntomas de esta afección incluyen:  •Diarrea acuosa.      •Heces sanguinolentas o alquitranadas.      •Dolor.      •Fiebre.      •Vómitos.      •Cansancio (fatiga).      •Pérdida de peso.      •Meteorismo.      •Dolor abdominal.      •Menos deposiciones que lo habitual.      •Yimi necesidad roque y repentina de tener deposiciones.      •Sentir que el intestino no se vacía después de las deposiciones.        ¿Cómo se diagnostica?    Esta afección se diagnostica mediante un análisis de heces o de monty.  También pueden hacerle otros estudios, por ejemplo:  •Radiografías.      •Yimi exploración por tomografía computarizada (TC).      •Colonoscopía.      •Endoscopía.      •Biopsia.        ¿Cómo se trata?  El tratamiento de esta afección depende de la causa. El tratamiento de esta afección puede incluir:  •Dejar descansar a los intestinos. Oatman implica no consumir alimentos ni líquidos deb un tiempo.      •Administración de líquidos por vía intravenosa.      •Medicamentos para el dolor y la diarrea.      •Antibióticos.      •Medicamentos con cortisona.      •Yimi cirugía.        Siga estas indicaciones en lombardo casa:      Comida y bebida      •Siga las indicaciones del médico respecto de las restricciones en las comidas o las bebidas.      •Becka suficiente líquido parker para mantener la orina de color amarillo pálido.      •Trabaje con un nutricionista para determinar cuáles son los alimentos que reagudizan la afección.      •Evite los alimentos que reagudizan la afección.      •Mantenga yimi dieta raven balanceada.      Indicaciones generales     •Si le recetaron un antibiótico, tómelo parker se lo haya indicado el médico. No deje de brock los antibióticos aunque comience a sentirse mejor.      •[place] los medicamentos de venta martine y los recetados solamente parker se lo haya indicado el médico.      •Concurra a todas las visitas de seguimiento parker se lo haya indicado el médico. [place] es importante.        Comuníquese con un médico si:    •Los síntomas no desaparecen.      •Tiene nuevos síntomas.        Solicite ayuda inmediatamente si:    •Tiene fiebre que no desaparece con el tratamiento.      •Tiene escalofríos.      •Sufre debilidad extrema, desmayos o deshidratación.      •Yost vomitado repetidas veces.      •Siente dolor intenso en el abdomen.      •Concha heces son sanguinolentas o alquitranadas.        Resumen    •La colitis es la inflamación del colon. La colitis puede durar un breve período (ser aguda) o prolongarse mucho tiempo (volverse crónica).      •El tratamiento de esta afección depende de la causa y puede incluir descanso de los intestinos, brock medicamentos o someterse a yimi cirugía.      •Si le recetaron un antibiótico, tómelo parker se lo haya indicado el médico. No deje de brock los antibióticos aunque comience a sentirse mejor.      •Solicite ayuda de inmediato si tiene dolor intenso en el abdomen.      •Concurra a todas las visitas de seguimiento parker se lo haya indicado el médico. [place] es importante.      Esta información no tiene parker fin reemplazar el consejo del médico. Asegúrese de hacerle al médico cualquier pregunta que tenga.

## 2021-03-28 LAB
CULTURE RESULTS: SIGNIFICANT CHANGE UP
SPECIMEN SOURCE: SIGNIFICANT CHANGE UP

## 2021-04-06 NOTE — ED ADULT NURSE NOTE - MUSCULOSKELETAL WDL
[FreeTextEntry3] : IJustin Shabtai MD, personally saw and evaluated the patient and developed the plan as documented above. I concur or have edited the note as appropriate.\par  Full range of motion of upper and lower extremities, no joint tenderness/swelling.

## 2021-06-03 NOTE — ED ADULT NURSE NOTE - NS ED NOTE ABUSE RESPONSE YN
Anticoagulation Annual Referral Renewal Review    Prem Taylor's chart reviewed for annual renewal of referral to anticoagulation monitoring.        Criteria for anticoagulation nurse and/or pharmacist renewal met   Warfarin indication: Atrial Fibrillation and PE Yes, per indication   Current with INR monitoring/compliant Yes Yes   Date of last office visit 9/24/19 Yes, had office visit within last year   Time in Therapeutic Range (TTR) 70 % Yes, TTR > 60%       Prem Taylor met all criteria for anticoagulation management program initiated renewal.  New INR standing orders and anticoagulation referral renewal placed.      Morales Tapia RN  9:04 AM      
Yes

## 2021-06-08 ENCOUNTER — ASOB RESULT (OUTPATIENT)
Age: 36
End: 2021-06-08

## 2021-06-08 ENCOUNTER — APPOINTMENT (OUTPATIENT)
Dept: ANTEPARTUM | Facility: CLINIC | Age: 36
End: 2021-06-08
Payer: MEDICAID

## 2021-06-08 PROBLEM — O03.9 COMPLETE OR UNSPECIFIED SPONTANEOUS ABORTION WITHOUT COMPLICATION: Chronic | Status: ACTIVE | Noted: 2021-03-28

## 2021-06-08 PROCEDURE — 76811 OB US DETAILED SNGL FETUS: CPT

## 2021-08-10 ENCOUNTER — OUTPATIENT (OUTPATIENT)
Dept: INPATIENT UNIT | Facility: HOSPITAL | Age: 36
LOS: 1 days | Discharge: HOME | End: 2021-08-10
Payer: MEDICAID

## 2021-08-10 ENCOUNTER — ASOB RESULT (OUTPATIENT)
Age: 36
End: 2021-08-10

## 2021-08-10 ENCOUNTER — APPOINTMENT (OUTPATIENT)
Dept: ANTEPARTUM | Facility: CLINIC | Age: 36
End: 2021-08-10

## 2021-08-10 DIAGNOSIS — Z90.49 ACQUIRED ABSENCE OF OTHER SPECIFIED PARTS OF DIGESTIVE TRACT: Chronic | ICD-10-CM

## 2021-08-10 DIAGNOSIS — O26.899 OTHER SPECIFIED PREGNANCY RELATED CONDITIONS, UNSPECIFIED TRIMESTER: ICD-10-CM

## 2021-08-10 LAB
ALBUMIN SERPL ELPH-MCNC: 2.8 G/DL — LOW (ref 3.3–5)
ALP SERPL-CCNC: 78 U/L — SIGNIFICANT CHANGE UP (ref 40–120)
ALT FLD-CCNC: 43 U/L — SIGNIFICANT CHANGE UP (ref 12–78)
ANION GAP SERPL CALC-SCNC: 12 MMOL/L — SIGNIFICANT CHANGE UP (ref 5–17)
APPEARANCE UR: CLEAR — SIGNIFICANT CHANGE UP
APTT BLD: 26.6 SEC — LOW (ref 27.5–35.5)
AST SERPL-CCNC: 23 U/L — SIGNIFICANT CHANGE UP (ref 15–37)
BASOPHILS # BLD AUTO: 0 K/UL — SIGNIFICANT CHANGE UP (ref 0–0.2)
BASOPHILS NFR BLD AUTO: 0 % — SIGNIFICANT CHANGE UP (ref 0–2)
BILIRUB SERPL-MCNC: 0.3 MG/DL — SIGNIFICANT CHANGE UP (ref 0.2–1.2)
BILIRUB UR-MCNC: NEGATIVE — SIGNIFICANT CHANGE UP
BUN SERPL-MCNC: 9 MG/DL — SIGNIFICANT CHANGE UP (ref 7–23)
CALCIUM SERPL-MCNC: 8.3 MG/DL — LOW (ref 8.5–10.1)
CHLORIDE SERPL-SCNC: 107 MMOL/L — SIGNIFICANT CHANGE UP (ref 96–108)
CO2 SERPL-SCNC: 19 MMOL/L — LOW (ref 22–31)
COLOR SPEC: YELLOW — SIGNIFICANT CHANGE UP
CREAT ?TM UR-MCNC: 127 MG/DL — SIGNIFICANT CHANGE UP
CREAT SERPL-MCNC: 0.46 MG/DL — LOW (ref 0.5–1.3)
DIFF PNL FLD: NEGATIVE — SIGNIFICANT CHANGE UP
EOSINOPHIL # BLD AUTO: 0 K/UL — SIGNIFICANT CHANGE UP (ref 0–0.5)
EOSINOPHIL NFR BLD AUTO: 0 % — SIGNIFICANT CHANGE UP (ref 0–6)
FIBRINOGEN PPP-MCNC: 557 MG/DL — HIGH (ref 290–520)
GLUCOSE SERPL-MCNC: 113 MG/DL — HIGH (ref 70–99)
GLUCOSE UR QL: NEGATIVE MG/DL — SIGNIFICANT CHANGE UP
HCT VFR BLD CALC: 33.1 % — LOW (ref 34.5–45)
HGB BLD-MCNC: 11.1 G/DL — LOW (ref 11.5–15.5)
INR BLD: 1.03 RATIO — SIGNIFICANT CHANGE UP (ref 0.88–1.16)
KETONES UR-MCNC: ABNORMAL
LDH SERPL L TO P-CCNC: 129 U/L — SIGNIFICANT CHANGE UP (ref 84–241)
LEUKOCYTE ESTERASE UR-ACNC: ABNORMAL
LYMPHOCYTES # BLD AUTO: 1.87 K/UL — SIGNIFICANT CHANGE UP (ref 1–3.3)
LYMPHOCYTES # BLD AUTO: 11 % — LOW (ref 13–44)
MCHC RBC-ENTMCNC: 28 PG — SIGNIFICANT CHANGE UP (ref 27–34)
MCHC RBC-ENTMCNC: 33.5 GM/DL — SIGNIFICANT CHANGE UP (ref 32–36)
MCV RBC AUTO: 83.6 FL — SIGNIFICANT CHANGE UP (ref 80–100)
MONOCYTES # BLD AUTO: 1.53 K/UL — HIGH (ref 0–0.9)
MONOCYTES NFR BLD AUTO: 9 % — SIGNIFICANT CHANGE UP (ref 2–14)
NEUTROPHILS # BLD AUTO: 13.61 K/UL — HIGH (ref 1.8–7.4)
NEUTROPHILS NFR BLD AUTO: 80 % — HIGH (ref 43–77)
NITRITE UR-MCNC: NEGATIVE — SIGNIFICANT CHANGE UP
NRBC # BLD: SIGNIFICANT CHANGE UP /100 WBCS (ref 0–0)
PH UR: 6 — SIGNIFICANT CHANGE UP (ref 5–8)
PLATELET # BLD AUTO: 249 K/UL — SIGNIFICANT CHANGE UP (ref 150–400)
POTASSIUM SERPL-MCNC: 3.7 MMOL/L — SIGNIFICANT CHANGE UP (ref 3.5–5.3)
POTASSIUM SERPL-SCNC: 3.7 MMOL/L — SIGNIFICANT CHANGE UP (ref 3.5–5.3)
PROT ?TM UR-MCNC: 21 MG/DL — HIGH (ref 0–12)
PROT ?TM UR-MCNC: 33 MG/DL — HIGH (ref 0–12)
PROT SERPL-MCNC: 6.7 GM/DL — SIGNIFICANT CHANGE UP (ref 6–8.3)
PROT UR-MCNC: 15 MG/DL
PROT/CREAT UR-RTO: 0.3 RATIO — HIGH (ref 0–0.2)
PROTHROM AB SERPL-ACNC: 12 SEC — SIGNIFICANT CHANGE UP (ref 10.6–13.6)
RBC # BLD: 3.96 M/UL — SIGNIFICANT CHANGE UP (ref 3.8–5.2)
RBC # FLD: 14 % — SIGNIFICANT CHANGE UP (ref 10.3–14.5)
SODIUM SERPL-SCNC: 138 MMOL/L — SIGNIFICANT CHANGE UP (ref 135–145)
SP GR SPEC: 1.01 — SIGNIFICANT CHANGE UP (ref 1.01–1.02)
URATE SERPL-MCNC: 4.1 MG/DL — SIGNIFICANT CHANGE UP (ref 2.5–7)
UROBILINOGEN FLD QL: NEGATIVE MG/DL — SIGNIFICANT CHANGE UP
WBC # BLD: 17.01 K/UL — HIGH (ref 3.8–10.5)
WBC # FLD AUTO: 17.01 K/UL — HIGH (ref 3.8–10.5)

## 2021-08-10 PROCEDURE — 85025 COMPLETE CBC W/AUTO DIFF WBC: CPT

## 2021-08-10 PROCEDURE — 85730 THROMBOPLASTIN TIME PARTIAL: CPT

## 2021-08-10 PROCEDURE — 87086 URINE CULTURE/COLONY COUNT: CPT

## 2021-08-10 PROCEDURE — 96374 THER/PROPH/DIAG INJ IV PUSH: CPT

## 2021-08-10 PROCEDURE — 80053 COMPREHEN METABOLIC PANEL: CPT

## 2021-08-10 PROCEDURE — 82570 ASSAY OF URINE CREATININE: CPT

## 2021-08-10 PROCEDURE — 83615 LACTATE (LD) (LDH) ENZYME: CPT

## 2021-08-10 PROCEDURE — 96360 HYDRATION IV INFUSION INIT: CPT

## 2021-08-10 PROCEDURE — 84156 ASSAY OF PROTEIN URINE: CPT

## 2021-08-10 PROCEDURE — 85610 PROTHROMBIN TIME: CPT

## 2021-08-10 PROCEDURE — 85384 FIBRINOGEN ACTIVITY: CPT

## 2021-08-10 PROCEDURE — G0463: CPT

## 2021-08-10 PROCEDURE — 36415 COLL VENOUS BLD VENIPUNCTURE: CPT

## 2021-08-10 PROCEDURE — 81001 URINALYSIS AUTO W/SCOPE: CPT

## 2021-08-10 PROCEDURE — 83690 ASSAY OF LIPASE: CPT

## 2021-08-10 PROCEDURE — 84550 ASSAY OF BLOOD/URIC ACID: CPT

## 2021-08-10 RX ORDER — ACETAMINOPHEN 500 MG
1000 TABLET ORAL ONCE
Refills: 0 | Status: COMPLETED | OUTPATIENT
Start: 2021-08-10 | End: 2021-08-10

## 2021-08-10 RX ORDER — ONDANSETRON 8 MG/1
4 TABLET, FILM COATED ORAL ONCE
Refills: 0 | Status: COMPLETED | OUTPATIENT
Start: 2021-08-10 | End: 2021-08-10

## 2021-08-10 RX ORDER — SODIUM CHLORIDE 9 MG/ML
1000 INJECTION, SOLUTION INTRAVENOUS ONCE
Refills: 0 | Status: COMPLETED | OUTPATIENT
Start: 2021-08-10 | End: 2021-08-10

## 2021-08-10 RX ADMIN — Medication 400 MILLIGRAM(S): at 04:56

## 2021-08-10 RX ADMIN — ONDANSETRON 4 MILLIGRAM(S): 8 TABLET, FILM COATED ORAL at 05:33

## 2021-08-10 RX ADMIN — SODIUM CHLORIDE 1000 MILLILITER(S): 9 INJECTION, SOLUTION INTRAVENOUS at 04:51

## 2021-08-11 LAB
CULTURE RESULTS: SIGNIFICANT CHANGE UP
SPECIMEN SOURCE: SIGNIFICANT CHANGE UP

## 2021-08-12 DIAGNOSIS — O26.899 OTHER SPECIFIED PREGNANCY RELATED CONDITIONS, UNSPECIFIED TRIMESTER: ICD-10-CM

## 2021-08-17 ENCOUNTER — ASOB RESULT (OUTPATIENT)
Age: 36
End: 2021-08-17

## 2021-08-17 ENCOUNTER — APPOINTMENT (OUTPATIENT)
Dept: MATERNAL FETAL MEDICINE | Facility: CLINIC | Age: 36
End: 2021-08-17
Payer: MEDICAID

## 2021-08-17 VITALS — WEIGHT: 166.56 LBS | HEIGHT: 63 IN | BODY MASS INDEX: 29.51 KG/M2

## 2021-08-17 PROCEDURE — G0108 DIAB MANAGE TRN  PER INDIV: CPT

## 2021-08-24 ENCOUNTER — APPOINTMENT (OUTPATIENT)
Dept: ANTEPARTUM | Facility: CLINIC | Age: 36
End: 2021-08-24
Payer: MEDICAID

## 2021-08-24 ENCOUNTER — ASOB RESULT (OUTPATIENT)
Age: 36
End: 2021-08-24

## 2021-08-24 ENCOUNTER — APPOINTMENT (OUTPATIENT)
Dept: MATERNAL FETAL MEDICINE | Facility: CLINIC | Age: 36
End: 2021-08-24
Payer: MEDICAID

## 2021-08-24 VITALS — BODY MASS INDEX: 29.59 KG/M2 | WEIGHT: 167 LBS | HEIGHT: 63 IN

## 2021-08-24 PROCEDURE — G0108 DIAB MANAGE TRN  PER INDIV: CPT

## 2021-08-24 PROCEDURE — 36415 COLL VENOUS BLD VENIPUNCTURE: CPT

## 2021-08-25 LAB
ESTIMATED AVERAGE GLUCOSE: 108 MG/DL
HBA1C MFR BLD HPLC: 5.4 %

## 2021-08-31 ENCOUNTER — APPOINTMENT (OUTPATIENT)
Dept: ANTEPARTUM | Facility: CLINIC | Age: 36
End: 2021-08-31
Payer: MEDICAID

## 2021-08-31 ENCOUNTER — ASOB RESULT (OUTPATIENT)
Age: 36
End: 2021-08-31

## 2021-08-31 PROCEDURE — 76820 UMBILICAL ARTERY ECHO: CPT

## 2021-08-31 PROCEDURE — 76816 OB US FOLLOW-UP PER FETUS: CPT

## 2021-09-07 ENCOUNTER — APPOINTMENT (OUTPATIENT)
Dept: ANTEPARTUM | Facility: CLINIC | Age: 36
End: 2021-09-07
Payer: MEDICAID

## 2021-09-07 ENCOUNTER — APPOINTMENT (OUTPATIENT)
Dept: MATERNAL FETAL MEDICINE | Facility: CLINIC | Age: 36
End: 2021-09-07
Payer: MEDICAID

## 2021-09-07 ENCOUNTER — ASOB RESULT (OUTPATIENT)
Age: 36
End: 2021-09-07

## 2021-09-07 VITALS — HEIGHT: 63 IN | WEIGHT: 171 LBS | BODY MASS INDEX: 30.3 KG/M2

## 2021-09-07 PROCEDURE — G0108 DIAB MANAGE TRN  PER INDIV: CPT

## 2021-09-07 PROCEDURE — 76820 UMBILICAL ARTERY ECHO: CPT

## 2021-09-07 PROCEDURE — 76819 FETAL BIOPHYS PROFIL W/O NST: CPT

## 2021-09-14 ENCOUNTER — APPOINTMENT (OUTPATIENT)
Dept: ANTEPARTUM | Facility: CLINIC | Age: 36
End: 2021-09-14
Payer: MEDICAID

## 2021-09-14 ENCOUNTER — ASOB RESULT (OUTPATIENT)
Age: 36
End: 2021-09-14

## 2021-09-14 PROCEDURE — 76816 OB US FOLLOW-UP PER FETUS: CPT

## 2021-09-14 PROCEDURE — 76820 UMBILICAL ARTERY ECHO: CPT

## 2021-09-14 PROCEDURE — 93976 VASCULAR STUDY: CPT

## 2021-09-21 ENCOUNTER — ASOB RESULT (OUTPATIENT)
Age: 36
End: 2021-09-21

## 2021-09-21 ENCOUNTER — APPOINTMENT (OUTPATIENT)
Dept: MATERNAL FETAL MEDICINE | Facility: CLINIC | Age: 36
End: 2021-09-21
Payer: MEDICAID

## 2021-09-21 ENCOUNTER — APPOINTMENT (OUTPATIENT)
Dept: ANTEPARTUM | Facility: CLINIC | Age: 36
End: 2021-09-21
Payer: MEDICAID

## 2021-09-21 VITALS — WEIGHT: 171 LBS | HEIGHT: 63 IN | BODY MASS INDEX: 30.3 KG/M2

## 2021-09-21 PROCEDURE — G0108 DIAB MANAGE TRN  PER INDIV: CPT | Mod: 95

## 2021-09-21 PROCEDURE — 76819 FETAL BIOPHYS PROFIL W/O NST: CPT

## 2021-09-21 PROCEDURE — 76820 UMBILICAL ARTERY ECHO: CPT

## 2021-09-28 ENCOUNTER — ASOB RESULT (OUTPATIENT)
Age: 36
End: 2021-09-28

## 2021-09-28 ENCOUNTER — APPOINTMENT (OUTPATIENT)
Dept: ANTEPARTUM | Facility: CLINIC | Age: 36
End: 2021-09-28
Payer: MEDICAID

## 2021-09-28 PROCEDURE — 76819 FETAL BIOPHYS PROFIL W/O NST: CPT

## 2021-10-05 ENCOUNTER — APPOINTMENT (OUTPATIENT)
Dept: MATERNAL FETAL MEDICINE | Facility: CLINIC | Age: 36
End: 2021-10-05
Payer: MEDICAID

## 2021-10-05 ENCOUNTER — ASOB RESULT (OUTPATIENT)
Age: 36
End: 2021-10-05

## 2021-10-05 VITALS — BODY MASS INDEX: 30.65 KG/M2 | WEIGHT: 173 LBS | HEIGHT: 63 IN

## 2021-10-05 DIAGNOSIS — Z78.9 OTHER SPECIFIED HEALTH STATUS: ICD-10-CM

## 2021-10-05 PROCEDURE — G0108 DIAB MANAGE TRN  PER INDIV: CPT | Mod: 95

## 2021-11-11 NOTE — ED STATDOCS - CHPI ED AGGRAVATING FACTORS
What Type Of Note Output Would You Prefer (Optional)?: Standard Output How Severe Is Your Skin Lesion?: mild Is This A New Presentation, Or A Follow-Up?: Skin Lesions EXERTION

## 2021-12-13 ENCOUNTER — EMERGENCY (EMERGENCY)
Facility: HOSPITAL | Age: 36
LOS: 0 days | Discharge: HOME | End: 2021-12-14
Attending: EMERGENCY MEDICINE
Payer: MEDICAID

## 2021-12-13 VITALS
RESPIRATION RATE: 32 BRPM | HEART RATE: 85 BPM | SYSTOLIC BLOOD PRESSURE: 200 MMHG | WEIGHT: 139.99 LBS | TEMPERATURE: 98 F | DIASTOLIC BLOOD PRESSURE: 109 MMHG | HEIGHT: 62 IN | OXYGEN SATURATION: 100 %

## 2021-12-13 DIAGNOSIS — K29.70 GASTRITIS, UNSPECIFIED, WITHOUT BLEEDING: ICD-10-CM

## 2021-12-13 DIAGNOSIS — I10 ESSENTIAL (PRIMARY) HYPERTENSION: ICD-10-CM

## 2021-12-13 DIAGNOSIS — Z90.49 ACQUIRED ABSENCE OF OTHER SPECIFIED PARTS OF DIGESTIVE TRACT: Chronic | ICD-10-CM

## 2021-12-13 DIAGNOSIS — Z90.49 ACQUIRED ABSENCE OF OTHER SPECIFIED PARTS OF DIGESTIVE TRACT: ICD-10-CM

## 2021-12-13 DIAGNOSIS — R10.13 EPIGASTRIC PAIN: ICD-10-CM

## 2021-12-13 PROCEDURE — 84484 ASSAY OF TROPONIN QUANT: CPT

## 2021-12-13 PROCEDURE — 71045 X-RAY EXAM CHEST 1 VIEW: CPT

## 2021-12-13 PROCEDURE — 99285 EMERGENCY DEPT VISIT HI MDM: CPT

## 2021-12-13 PROCEDURE — U0003: CPT

## 2021-12-13 PROCEDURE — 96375 TX/PRO/DX INJ NEW DRUG ADDON: CPT

## 2021-12-13 PROCEDURE — 84702 CHORIONIC GONADOTROPIN TEST: CPT

## 2021-12-13 PROCEDURE — 74174 CTA ABD&PLVS W/CONTRAST: CPT | Mod: MA

## 2021-12-13 PROCEDURE — 85025 COMPLETE CBC W/AUTO DIFF WBC: CPT

## 2021-12-13 PROCEDURE — 99284 EMERGENCY DEPT VISIT MOD MDM: CPT | Mod: 25

## 2021-12-13 PROCEDURE — U0005: CPT

## 2021-12-13 PROCEDURE — 93005 ELECTROCARDIOGRAM TRACING: CPT

## 2021-12-13 PROCEDURE — 71275 CT ANGIOGRAPHY CHEST: CPT | Mod: MA

## 2021-12-13 PROCEDURE — 83735 ASSAY OF MAGNESIUM: CPT

## 2021-12-13 PROCEDURE — 83690 ASSAY OF LIPASE: CPT

## 2021-12-13 PROCEDURE — 80053 COMPREHEN METABOLIC PANEL: CPT

## 2021-12-13 PROCEDURE — 96374 THER/PROPH/DIAG INJ IV PUSH: CPT

## 2021-12-13 PROCEDURE — 36415 COLL VENOUS BLD VENIPUNCTURE: CPT

## 2021-12-13 NOTE — ED ADULT TRIAGE NOTE - CHIEF COMPLAINT QUOTE
Pt hx of HTN presents to the ED c/o sudden onset chest pain and SOB x 1 hr. tachypneic in triage, +diaphoretic in triage. States pain radiates to R shoulder. Sent in for STAT EKG. /109, SPO2 100%RA. Pt with no other complaints at this time

## 2021-12-14 VITALS
SYSTOLIC BLOOD PRESSURE: 115 MMHG | HEART RATE: 82 BPM | TEMPERATURE: 98 F | DIASTOLIC BLOOD PRESSURE: 85 MMHG | OXYGEN SATURATION: 99 % | RESPIRATION RATE: 18 BRPM

## 2021-12-14 LAB
ADD ON TEST-SPECIMEN IN LAB: SIGNIFICANT CHANGE UP
ALBUMIN SERPL ELPH-MCNC: 4.1 G/DL — SIGNIFICANT CHANGE UP (ref 3.3–5)
ALP SERPL-CCNC: 99 U/L — SIGNIFICANT CHANGE UP (ref 40–120)
ALT FLD-CCNC: 55 U/L — SIGNIFICANT CHANGE UP (ref 12–78)
ANION GAP SERPL CALC-SCNC: 7 MMOL/L — SIGNIFICANT CHANGE UP (ref 5–17)
AST SERPL-CCNC: 30 U/L — SIGNIFICANT CHANGE UP (ref 15–37)
BASOPHILS # BLD AUTO: 0 K/UL — SIGNIFICANT CHANGE UP (ref 0–0.2)
BASOPHILS NFR BLD AUTO: 0 % — SIGNIFICANT CHANGE UP (ref 0–2)
BILIRUB SERPL-MCNC: 0.2 MG/DL — SIGNIFICANT CHANGE UP (ref 0.2–1.2)
BUN SERPL-MCNC: 15 MG/DL — SIGNIFICANT CHANGE UP (ref 7–23)
CALCIUM SERPL-MCNC: 9.2 MG/DL — SIGNIFICANT CHANGE UP (ref 8.5–10.1)
CHLORIDE SERPL-SCNC: 108 MMOL/L — SIGNIFICANT CHANGE UP (ref 96–108)
CO2 SERPL-SCNC: 23 MMOL/L — SIGNIFICANT CHANGE UP (ref 22–31)
CREAT SERPL-MCNC: 0.73 MG/DL — SIGNIFICANT CHANGE UP (ref 0.5–1.3)
EOSINOPHIL # BLD AUTO: 0 K/UL — SIGNIFICANT CHANGE UP (ref 0–0.5)
EOSINOPHIL NFR BLD AUTO: 0 % — SIGNIFICANT CHANGE UP (ref 0–6)
GLUCOSE SERPL-MCNC: 143 MG/DL — HIGH (ref 70–99)
HCG SERPL-ACNC: <1 MIU/ML — SIGNIFICANT CHANGE UP
HCT VFR BLD CALC: 39.9 % — SIGNIFICANT CHANGE UP (ref 34.5–45)
HGB BLD-MCNC: 13.3 G/DL — SIGNIFICANT CHANGE UP (ref 11.5–15.5)
LYMPHOCYTES # BLD AUTO: 4.64 K/UL — HIGH (ref 1–3.3)
LYMPHOCYTES # BLD AUTO: 58 % — HIGH (ref 13–44)
MAGNESIUM SERPL-MCNC: 1.6 MG/DL — SIGNIFICANT CHANGE UP (ref 1.6–2.6)
MCHC RBC-ENTMCNC: 27.1 PG — SIGNIFICANT CHANGE UP (ref 27–34)
MCHC RBC-ENTMCNC: 33.3 GM/DL — SIGNIFICANT CHANGE UP (ref 32–36)
MCV RBC AUTO: 81.3 FL — SIGNIFICANT CHANGE UP (ref 80–100)
MONOCYTES # BLD AUTO: 0.48 K/UL — SIGNIFICANT CHANGE UP (ref 0–0.9)
MONOCYTES NFR BLD AUTO: 6 % — SIGNIFICANT CHANGE UP (ref 2–14)
NEUTROPHILS # BLD AUTO: 2.16 K/UL — SIGNIFICANT CHANGE UP (ref 1.8–7.4)
NEUTROPHILS NFR BLD AUTO: 27 % — LOW (ref 43–77)
NRBC # BLD: SIGNIFICANT CHANGE UP /100 WBCS (ref 0–0)
PLATELET # BLD AUTO: 361 K/UL — SIGNIFICANT CHANGE UP (ref 150–400)
POTASSIUM SERPL-MCNC: 3.2 MMOL/L — LOW (ref 3.5–5.3)
POTASSIUM SERPL-SCNC: 3.2 MMOL/L — LOW (ref 3.5–5.3)
PROT SERPL-MCNC: 8.7 GM/DL — HIGH (ref 6–8.3)
RBC # BLD: 4.91 M/UL — SIGNIFICANT CHANGE UP (ref 3.8–5.2)
RBC # FLD: 15 % — HIGH (ref 10.3–14.5)
SARS-COV-2 RNA SPEC QL NAA+PROBE: SIGNIFICANT CHANGE UP
SODIUM SERPL-SCNC: 138 MMOL/L — SIGNIFICANT CHANGE UP (ref 135–145)
TROPONIN I, HIGH SENSITIVITY RESULT: 11.31 NG/L — SIGNIFICANT CHANGE UP
TROPONIN I, HIGH SENSITIVITY RESULT: 9.14 NG/L — SIGNIFICANT CHANGE UP
WBC # BLD: 8 K/UL — SIGNIFICANT CHANGE UP (ref 3.8–10.5)
WBC # FLD AUTO: 8 K/UL — SIGNIFICANT CHANGE UP (ref 3.8–10.5)

## 2021-12-14 PROCEDURE — 93010 ELECTROCARDIOGRAM REPORT: CPT

## 2021-12-14 PROCEDURE — 71275 CT ANGIOGRAPHY CHEST: CPT | Mod: 26,MA

## 2021-12-14 PROCEDURE — 71045 X-RAY EXAM CHEST 1 VIEW: CPT | Mod: 26

## 2021-12-14 PROCEDURE — 74174 CTA ABD&PLVS W/CONTRAST: CPT | Mod: 26,MA

## 2021-12-14 RX ORDER — ACETAMINOPHEN 500 MG
1000 TABLET ORAL ONCE
Refills: 0 | Status: COMPLETED | OUTPATIENT
Start: 2021-12-14 | End: 2021-12-14

## 2021-12-14 RX ORDER — FAMOTIDINE 10 MG/ML
20 INJECTION INTRAVENOUS ONCE
Refills: 0 | Status: COMPLETED | OUTPATIENT
Start: 2021-12-14 | End: 2021-12-14

## 2021-12-14 RX ORDER — DIPHENHYDRAMINE HYDROCHLORIDE AND LIDOCAINE HYDROCHLORIDE AND ALUMINUM HYDROXIDE AND MAGNESIUM HYDRO
5 KIT ONCE
Refills: 0 | Status: COMPLETED | OUTPATIENT
Start: 2021-12-14 | End: 2021-12-14

## 2021-12-14 RX ORDER — HYDRALAZINE HCL 50 MG
10 TABLET ORAL ONCE
Refills: 0 | Status: COMPLETED | OUTPATIENT
Start: 2021-12-14 | End: 2021-12-14

## 2021-12-14 RX ORDER — MORPHINE SULFATE 50 MG/1
4 CAPSULE, EXTENDED RELEASE ORAL ONCE
Refills: 0 | Status: DISCONTINUED | OUTPATIENT
Start: 2021-12-14 | End: 2021-12-14

## 2021-12-14 RX ORDER — ONDANSETRON 8 MG/1
4 TABLET, FILM COATED ORAL ONCE
Refills: 0 | Status: COMPLETED | OUTPATIENT
Start: 2021-12-14 | End: 2021-12-14

## 2021-12-14 RX ORDER — LIDOCAINE 4 G/100G
5 CREAM TOPICAL ONCE
Refills: 0 | Status: DISCONTINUED | OUTPATIENT
Start: 2021-12-14 | End: 2021-12-14

## 2021-12-14 RX ORDER — OMEPRAZOLE 10 MG/1
1 CAPSULE, DELAYED RELEASE ORAL
Qty: 30 | Refills: 0
Start: 2021-12-14 | End: 2022-01-12

## 2021-12-14 RX ADMIN — MORPHINE SULFATE 4 MILLIGRAM(S): 50 CAPSULE, EXTENDED RELEASE ORAL at 00:43

## 2021-12-14 RX ADMIN — FAMOTIDINE 20 MILLIGRAM(S): 10 INJECTION INTRAVENOUS at 02:07

## 2021-12-14 RX ADMIN — DIPHENHYDRAMINE HYDROCHLORIDE AND LIDOCAINE HYDROCHLORIDE AND ALUMINUM HYDROXIDE AND MAGNESIUM HYDRO 5 MILLILITER(S): KIT at 05:49

## 2021-12-14 RX ADMIN — Medication 10 MILLIGRAM(S): at 00:42

## 2021-12-14 RX ADMIN — Medication 400 MILLIGRAM(S): at 05:49

## 2021-12-14 RX ADMIN — ONDANSETRON 4 MILLIGRAM(S): 8 TABLET, FILM COATED ORAL at 00:43

## 2021-12-14 NOTE — ED ADULT NURSE REASSESSMENT NOTE - NS ED NURSE REASSESS COMMENT FT1
care assumed from SHIRA BROWN pt. assessed at bedside, no acute distress noted. chart reviewed. no needs or complaints at this time.

## 2021-12-14 NOTE — ED PROVIDER NOTE - NSFOLLOWUPINSTRUCTIONS_ED_ALL_ED_FT
Gastritis - Adultos  Gastritis, Adult       La gastritis es la inflamación del estómago. Hay dos tipos de gastritis:    Gastritis aguda. Olivia tipo aparece de manera repentina.  Gastritis crónica. Olivia tipo es mucho más frecuente y dura mucho tiempo.    La gastritis se manifiesta cuando el revestimiento del estómago se debilita o se lesiona. Sin tratamiento, la gastritis puede causar sangrado y úlceras estomacales.    ¿Cuáles son las causas?  Esta afección puede ser causada por lo siguiente:    Infección.  Beber alcohol en exceso.  Ciertos medicamentos. Estos incluyen corticoesteroides, antibióticos y algunos medicamentos de venta sin receta, parker aspirina o ibuprofeno.  Hay demasiado ácido en el estómago.  Yimi enfermedad del intestino o del estómago.  Estrés.  Yimi reacción alérgica.  Enfermedad de Crohn.  Algunos tratamientos para el cáncer (radiación).    A veces, se desconoce la causa de esta afección.    ¿Cuáles son los signos o los síntomas?  Los síntomas de esta afección incluyen los siguientes:    Dolor o sensación de ardor en la parte superior del abdomen.  Náuseas.  Vómitos.  Sensación molesta de saciedad después de comer.  Pérdida de peso.  Mal aliento.  Monty en el vómito o las heces.    En algunos casos, no hay síntomas.    ¿Cómo se diagnostica?  Esta afección puede diagnosticarse en función de lo siguiente:    Concha antecedentes médicos y yimi descripción de concha síntomas.  Un examen físico.  Estudios. Estos pueden incluir los siguientes:    Análisis de monty.  Pruebas de heces.  Un estudio en el que se introduce un instrumento judd y flexible con yimi london y yimi pequeña cámara a través del esófago hasta el estómago (endoscopía arlette).  Un estudio en el cual se nilda yimi muestra de tejido para analizarlo (biopsia).    ¿Cómo se trata?  Esta afección se puede tratar con medicamentos. Los medicamentos que se utilizan varían según la causa de la gastritis:    Si la afección se debe a yimi infección bacteriana, pueden recetarle medicamentos antibióticos.  Si la afección se debe al exceso de ácido estomacal, se pueden administrar medicamentos denominados bloqueadores H2, inhibidores de la bomba de protones o antiácidos.    El tratamiento puede también incluir interrumpir el uso de ciertos medicamentos parker aspirina, ibuprofeno u otros antiinflamatorios no esteroideos (ESTEFANY).    Siga estas indicaciones en lombardo casa:      Medicamentos    [place] los medicamentos de venta martine y los recetados solamente parker se lo haya indicado el médico.  Si le recetaron un antibiótico, tómelo parker se lo haya indicado el médico. No deje de brock el antibiótico, aunque comience a sentirse mejor.        Comida y bebida     [place] comidas pequeñas y frecuentes deb el día en lugar de comidas abundantes.  Evite los alimentos y las bebidas que intensifican los síntomas.  Becka suficiente líquido parker para mantener la orina de color amarillo pálido.        Consumo de alcohol    No becka alcohol si:    Lombardo médico le indica no hacerlo.  Está embarazada, puede estar embarazada o está tratando de quedar embarazada.  Si agustin alcohol:    Limite lombardo uso a las siguientes medidas:    De 0 a 1 medida por día para las mujeres.  De 0 a 2 medidas por día para los hombres.  Esté atento a la cantidad de alcohol que hay en las bebidas que nilda. En [place], yimi medida equivale a yimi botella de cerveza de 12 oz (355 ml), un vaso de vino de 5 oz (148 ml) o un vaso de yimi bebida alcohólica de arlette graduación de 1½ oz (44 ml).        Indicaciones generales    Hable con el médico sobre las formas de controlar el estrés, parker realizar ejercicio con regularidad o practicar respiración profunda, meditación o yoga.  No consuma ningún producto que contenga nicotina o tabaco, parker cigarrillos y cigarrillos electrónicos. Si necesita ayuda para dejar de fumar, consulte al médico.  Concurra a todas las visitas de seguimiento parker se lo haya indicado el médico. [place] es importante.    Comuníquese con un médico si:  Concha síntomas empeoran.  Los síntomas regresan después del tratamiento.    Solicite ayuda inmediatamente si:  Vomita monty de color otero brillante o yimi sustancia similar a los granos de café.  Las heces son negras o de color otero oscuro.  No puede retener los líquidos.  El dolor abdominal empeora.  Tiene fiebre.  No se siente mejor luego de yimi semana.    Resumen  La gastritis es la inflamación del revestimiento del estómago que puede ocurrir de manera repentina (aguda) o desarrollarse lentamente con el tiempo (crónica).  Esta afección se diagnostica mediante los antecedentes médicos, un examen físico o estudios.  Esta afección puede tratarse con medicamentos para tratar la infección o medicamentos para reducir la cantidad de ácido en el estómago.  Siga las indicaciones del médico acerca de brock medicamentos, hacer cambios en la dieta y saber cuándo pedir ayuda.    NOTAS ADICIONALES E INSTRUCCIONES    Please follow up with your Primary MD in 24-48 hr.  Seek immediate medical care for any new/worsening signs or symptoms.

## 2021-12-14 NOTE — ED PROVIDER NOTE - OBJECTIVE STATEMENT
35 y/o F 2 months post-partum with h/o hypertension on Labetalol 200 mg BID p/w sudden onset of severe, sharp epigastric pain radiating to the back, chest and BUE that began about 45 minutes PTA.  PT notes taking her Labetalol today.  Pt states pain is 10/10 and is in obvious distress due to pain.  PT had a BTL and cholecystectomy.  PT is a non-smoker and denies alcohol or drug use.

## 2021-12-14 NOTE — ED ADULT NURSE NOTE - OBJECTIVE STATEMENT
Patient c/o epigastric pain, patient appears very uncomfortable.  Hx of HTN, on medications, very hypertensive in ED.

## 2021-12-14 NOTE — ED PROVIDER NOTE - CARE PROVIDER_API CALL
Raulito Brian)  Gastroenterology; Internal Medicine  [address] [place] [address]  Phone: [phone]  Fax: [phone]  Follow Up Time: 7-10 Days

## 2021-12-14 NOTE — ED PROVIDER NOTE - PATIENT PORTAL LINK FT
You can access the FollowMyHealth Patient Portal offered by [facility] by registering at the following website: [email or web address]. By joining [email or web address]’s FollowMyHealth portal, you will also be able to view your health information using other applications (apps) compatible with our system.

## 2021-12-14 NOTE — ED ADULT NURSE REASSESSMENT NOTE - NS ED NURSE REASSESS COMMENT FT1
Pt resting comfortably in bed with no acute distress noted. Pt updated on their status, the current plan of care, and available results no needs or requests at this time. Call bell within reach. Will continue to monitor/reassess.   pt. given crackers and juice. pt. tolerates PO well. no N/V/pain at this time. pt. ready for discharge.

## 2021-12-22 ENCOUNTER — NON-APPOINTMENT (OUTPATIENT)
Age: 36
End: 2021-12-22

## 2021-12-22 ENCOUNTER — APPOINTMENT (OUTPATIENT)
Dept: CARDIOLOGY | Facility: CLINIC | Age: 36
End: 2021-12-22
Payer: MEDICAID

## 2021-12-22 VITALS
WEIGHT: 158 LBS | SYSTOLIC BLOOD PRESSURE: 164 MMHG | OXYGEN SATURATION: 98 % | DIASTOLIC BLOOD PRESSURE: 100 MMHG | HEART RATE: 78 BPM | HEIGHT: 63 IN | BODY MASS INDEX: 28 KG/M2

## 2021-12-22 DIAGNOSIS — O24.415 GESTATIONAL DIABETES MELLITUS IN PREGNANCY, CONTROLLED BY ORAL HYPOGLYCEMIC DRUGS: ICD-10-CM

## 2021-12-22 DIAGNOSIS — I10 ESSENTIAL (PRIMARY) HYPERTENSION: ICD-10-CM

## 2021-12-22 PROCEDURE — 93000 ELECTROCARDIOGRAM COMPLETE: CPT

## 2021-12-22 PROCEDURE — 99204 OFFICE O/P NEW MOD 45 MIN: CPT

## 2021-12-22 RX ORDER — OMEPRAZOLE 20 MG/1
20 CAPSULE, DELAYED RELEASE ORAL DAILY
Refills: 0 | Status: ACTIVE | COMMUNITY

## 2021-12-22 RX ORDER — PNV NO.95/FERROUS FUM/FOLIC AC 28MG-0.8MG
TABLET ORAL DAILY
Refills: 0 | Status: ACTIVE | COMMUNITY
Start: 2021-12-22

## 2021-12-22 NOTE — HISTORY OF PRESENT ILLNESS
[FreeTextEntry1] :  36 year old Latvian speaking female with PMH: HTN, gestational diabetes presents to the office today at the request of her PCP for evaluation of chest pain. History obtained by patient with CAMIOL miller.  Patient is  2 months post-partum on Labetalol 200 mg BID.  She was recently evaluated at  ER for sudden onset of severe, sharp chest pain that radiated to epigastric area and left arm;  woke her from her sleep. Pain lasted 2 hours.  CXR normal.  CTA of the Chest, Abdomen and Pelvis negative for aneurysm or dissection. The uterine endometrial cavity is prominent at 2.3 cm. She was treated for gastritis and discharged home on omeprazole.  Patient states she had one more episode of chest/epigastric discomfort that occurred last weekend while washing dishes.  Discomfort lasted for 15 minutes before resolving.  \par \par PAST SURGICAL HISTORY:\par History of cholecystectomy.\par BTL\par \par \par FAMILY HISTORY:\par No pertinent family history in first degree relatives.\par \par Tobacco Usage:\par - Tobacco Usage former smoker\par -Alcohol denies\par -Recreational drugs: denies\par -exercise: No vigorous exercise as per OB for 3 months (post )\par \par ALLERGIES AND HOME MEDICATIONS:\par No Known Allergies:\par \par Medications:\par Labetalol 200mg BID\par omeprazole 20mg \par Prenatal vitamin\par \par EKG: Normal sinus rhythm\par \par

## 2021-12-22 NOTE — DISCUSSION/SUMMARY
[FreeTextEntry1] : Chest pain: Patient with 2 episodes of sharp chest/epigastric discomfort that radiates to left arm and is associated with shortness of breath.  EKG shows normal sinus rhythm.  Patient currently without chest pain.  Blood pressure suboptimal which could be contributing factor.  Recommended patient increase labetalol from 200mg BID to 300mg BID.  A coronary CTA will be performed to rule out CAD.  An echocardiogram will be done to evaluate LVF, pulmonary pressures and r/o structural abnormalities.\par \par HTN: See above.  Patient encouraged to follow a low sodium diet and monitor blood pressure at home. Goal blood pressure 130/80 or below.\par \par Patient will follow up with GI , Dr. Brian.\par \par Patient will follow up with  in one month.

## 2021-12-22 NOTE — REVIEW OF SYSTEMS
[SOB] : shortness of breath [Dyspnea on exertion] : not dyspnea during exertion [Chest Discomfort] : chest discomfort [Lower Ext Edema] : no extremity edema [Leg Claudication] : no intermittent leg claudication [Palpitations] : no palpitations [Orthopnea] : no orthopnea [PND] : no PND [Syncope] : no syncope [Rash] : no rash [Dizziness] : no dizziness [Negative] : Heme/Lymph [FreeTextEntry7] : epigastric pain

## 2021-12-22 NOTE — PHYSICAL EXAM
[Well Developed] : well developed [Well Nourished] : well nourished [No Acute Distress] : no acute distress [Normal Conjunctiva] : normal conjunctiva [Normal Venous Pressure] : normal venous pressure [No Carotid Bruit] : no carotid bruit [Normal S1, S2] : normal S1, S2 [No Murmur] : no murmur [No Rub] : no rub [No Gallop] : no gallop [Clear Lung Fields] : clear lung fields [Good Air Entry] : good air entry [Soft] : abdomen soft [Non Tender] : non-tender [No Masses/organomegaly] : no masses/organomegaly [Normal Bowel Sounds] : normal bowel sounds [Normal Gait] : normal gait [No Edema] : no edema [No Cyanosis] : no cyanosis [No Rash] : no rash [Moves all extremities] : moves all extremities [Alert and Oriented] : alert and oriented [Normal memory] : normal memory

## 2022-01-05 ENCOUNTER — APPOINTMENT (OUTPATIENT)
Dept: CARDIOLOGY | Facility: CLINIC | Age: 37
End: 2022-01-05
Payer: MEDICAID

## 2022-01-05 PROCEDURE — 93306 TTE W/DOPPLER COMPLETE: CPT

## 2022-01-14 ENCOUNTER — APPOINTMENT (OUTPATIENT)
Dept: CT IMAGING | Facility: CLINIC | Age: 37
End: 2022-01-14
Payer: MEDICAID

## 2022-01-14 ENCOUNTER — OUTPATIENT (OUTPATIENT)
Dept: OUTPATIENT SERVICES | Facility: HOSPITAL | Age: 37
LOS: 1 days | End: 2022-01-14
Payer: MEDICAID

## 2022-01-14 DIAGNOSIS — Z90.49 ACQUIRED ABSENCE OF OTHER SPECIFIED PARTS OF DIGESTIVE TRACT: Chronic | ICD-10-CM

## 2022-01-14 DIAGNOSIS — R07.89 OTHER CHEST PAIN: ICD-10-CM

## 2022-01-14 DIAGNOSIS — Z00.8 ENCOUNTER FOR OTHER GENERAL EXAMINATION: ICD-10-CM

## 2022-01-14 PROCEDURE — 75574 CT ANGIO HRT W/3D IMAGE: CPT

## 2022-01-14 PROCEDURE — 75574 CT ANGIO HRT W/3D IMAGE: CPT | Mod: 26

## 2022-01-18 ENCOUNTER — NON-APPOINTMENT (OUTPATIENT)
Age: 37
End: 2022-01-18

## 2022-01-19 ENCOUNTER — NON-APPOINTMENT (OUTPATIENT)
Age: 37
End: 2022-01-19

## 2022-01-19 ENCOUNTER — APPOINTMENT (OUTPATIENT)
Dept: CARDIOLOGY | Facility: CLINIC | Age: 37
End: 2022-01-19
Payer: MEDICAID

## 2022-01-19 VITALS
DIASTOLIC BLOOD PRESSURE: 92 MMHG | WEIGHT: 164 LBS | OXYGEN SATURATION: 100 % | HEART RATE: 78 BPM | SYSTOLIC BLOOD PRESSURE: 141 MMHG | HEIGHT: 63 IN | BODY MASS INDEX: 29.06 KG/M2

## 2022-01-19 PROCEDURE — 99215 OFFICE O/P EST HI 40 MIN: CPT

## 2022-01-19 PROCEDURE — 93000 ELECTROCARDIOGRAM COMPLETE: CPT

## 2022-01-19 NOTE — HISTORY OF PRESENT ILLNESS
[FreeTextEntry1] : 37 y/o here for followup.\par \par [place]  used: Raymundo # [id number].\par \par 36 year old Swazi speaking female w/\par \par HTN\par gestational diabetes\par no prior cardiac history\par \par referred by PCP Dr. ELIAS Garduno for chest pain.\par Reviewed initial visit history: went to  ED\par w/ severe, sharp chest pain to epigastrium & left arm\par woke up out of sleep.  Lasted 2 hrs. \par At  ED, CXR normal. CTA of the Chest, Abdomen and Pelvis negative \par for aneurysm or dissection. \par The uterine endometrial cavity is prominent at 2.3 cm. \par She was treated for gastritis and discharged home on omeprazole.\par \par Reviewed coronary CTA : no disease\par calcium score =0 & echo  EF 55-60% mild LAE,\par mild mitral regurg.\par \par Since then, had one more episode but it was brief\par in the house was doing chores duration minutes.\par \par pt states also referred for BP management.\par Pt's BP at home 134/80, 129/78.\par Most of measurements are above 130.\par \par Pt notes improvement in symptoms on omeprazole.\par Has not seen Dr. Brian apt is for next month .\par \par 1st diagnosed w/ high BP 6 yrs ago 30 yrs.\par Does not recall prior workup for 2ndary causes.\par Reports fatigue but wakes up to feed baby at night.\par \par Not pregnancy no plans for pregnancy in future\par had tubal ligation.\par \par During pregnancy was told she had high BP \par then had , BP masoud during that\par Treated w/ labetalol for both pregnancies.\par \par STOP-BANG score: 2 (low)\par \par *ECGs - NSST T wave inversions V4-V6 \par new c/w prior ECG.\par \par \par \par \par \par \par \par \par \par

## 2022-01-19 NOTE — ASSESSMENT
Recommend WARM COMPRESSES. [FreeTextEntry1] : A/P:\par \par *chest pain \par -unlikely due to heart, likely due to gastritis.\par -no further evaluation indicated.\par \par *HTN\par -onset 29-30y suspect 2ndary causes\par -2ndary cause evaluation: Renal US, ting/renin levels, home sleep study.\par -literature for HTN & low sodium diet prescribed\par \par *obesity\par -pt had questions regarding weight loss extensive counseling provided.\par \par Return in 1 month to review results.\par

## 2022-02-01 ENCOUNTER — APPOINTMENT (OUTPATIENT)
Age: 37
End: 2022-02-01
Payer: MEDICAID

## 2022-02-01 ENCOUNTER — APPOINTMENT (OUTPATIENT)
Dept: ULTRASOUND IMAGING | Facility: CLINIC | Age: 37
End: 2022-02-01

## 2022-02-01 ENCOUNTER — OUTPATIENT (OUTPATIENT)
Dept: OUTPATIENT SERVICES | Facility: HOSPITAL | Age: 37
LOS: 1 days | End: 2022-02-01
Payer: COMMERCIAL

## 2022-02-01 DIAGNOSIS — G47.33 OBSTRUCTIVE SLEEP APNEA (ADULT) (PEDIATRIC): ICD-10-CM

## 2022-02-01 DIAGNOSIS — Z90.49 ACQUIRED ABSENCE OF OTHER SPECIFIED PARTS OF DIGESTIVE TRACT: Chronic | ICD-10-CM

## 2022-02-01 PROCEDURE — 95806 SLEEP STUDY UNATT&RESP EFFT: CPT | Mod: 26

## 2022-02-01 PROCEDURE — 95806 SLEEP STUDY UNATT&RESP EFFT: CPT

## 2022-02-02 ENCOUNTER — RESULT REVIEW (OUTPATIENT)
Age: 37
End: 2022-02-02

## 2022-02-02 ENCOUNTER — OUTPATIENT (OUTPATIENT)
Dept: OUTPATIENT SERVICES | Facility: HOSPITAL | Age: 37
LOS: 1 days | End: 2022-02-02
Payer: MEDICAID

## 2022-02-02 ENCOUNTER — APPOINTMENT (OUTPATIENT)
Dept: ULTRASOUND IMAGING | Facility: CLINIC | Age: 37
End: 2022-02-02
Payer: MEDICAID

## 2022-02-02 DIAGNOSIS — I10 ESSENTIAL (PRIMARY) HYPERTENSION: ICD-10-CM

## 2022-02-02 DIAGNOSIS — Z90.49 ACQUIRED ABSENCE OF OTHER SPECIFIED PARTS OF DIGESTIVE TRACT: Chronic | ICD-10-CM

## 2022-02-02 PROCEDURE — 93975 VASCULAR STUDY: CPT

## 2022-02-02 PROCEDURE — 93975 VASCULAR STUDY: CPT | Mod: 26

## 2022-02-07 ENCOUNTER — APPOINTMENT (OUTPATIENT)
Dept: GASTROENTEROLOGY | Facility: CLINIC | Age: 37
End: 2022-02-07
Payer: MEDICAID

## 2022-02-07 VITALS
WEIGHT: 164 LBS | HEIGHT: 63 IN | HEART RATE: 59 BPM | DIASTOLIC BLOOD PRESSURE: 70 MMHG | BODY MASS INDEX: 29.06 KG/M2 | SYSTOLIC BLOOD PRESSURE: 122 MMHG

## 2022-02-07 DIAGNOSIS — R10.13 EPIGASTRIC PAIN: ICD-10-CM

## 2022-02-07 DIAGNOSIS — R07.89 OTHER CHEST PAIN: ICD-10-CM

## 2022-02-07 DIAGNOSIS — Z12.11 ENCOUNTER FOR SCREENING FOR MALIGNANT NEOPLASM OF COLON: ICD-10-CM

## 2022-02-07 PROCEDURE — 99204 OFFICE O/P NEW MOD 45 MIN: CPT

## 2022-02-07 NOTE — ASSESSMENT
[FreeTextEntry1] : 38yo female with atypical chest pain, GERD\par She is improving on omeprazole and dietary changes\par \par will continue omeprazole for 4 more weeks \par will check hpylori breath test\par \par will hold off on EGD for now pending results of above

## 2022-02-07 NOTE — HISTORY OF PRESENT ILLNESS
[de-identified] : 36yo female with atypical chest pain, epigastric pain\par \par Pt with increasing atypical chest pain, GERD, epigastric pain\par She was in ER and saw cardiology with negative evaluation\par She has been on Omeprazole and made some dietary changes\par She has improved with these measures without recent recurrent symptoms\par She is 4 mths post-partum and symptoms developed after pregnancy\par Some gerd and epigastric discomfort as well

## 2022-02-07 NOTE — PHYSICAL EXAM
[General Appearance - Alert] : alert [General Appearance - In No Acute Distress] : in no acute distress [Auscultation Breath Sounds / Voice Sounds] : lungs were clear to auscultation bilaterally [Heart Rate And Rhythm] : heart rate was normal and rhythm regular [Heart Sounds] : normal S1 and S2 [Heart Sounds Gallop] : no gallops [Murmurs] : no murmurs [Heart Sounds Pericardial Friction Rub] : no pericardial rub [Bowel Sounds] : normal bowel sounds [Abdomen Soft] : soft [] : no hepato-splenomegaly [Abdomen Mass (___ Cm)] : no abdominal mass palpated [Abnormal Walk] : normal gait [Nail Clubbing] : no clubbing  or cyanosis of the fingernails [Musculoskeletal - Swelling] : no joint swelling seen [Motor Tone] : muscle strength and tone were normal [Oriented To Time, Place, And Person] : oriented to person, place, and time [Impaired Insight] : insight and judgment were intact [Affect] : the affect was normal [FreeTextEntry1] : mildly tender epigastrium

## 2022-02-08 ENCOUNTER — APPOINTMENT (OUTPATIENT)
Dept: CARDIOLOGY | Facility: CLINIC | Age: 37
End: 2022-02-08

## 2022-02-15 ENCOUNTER — APPOINTMENT (OUTPATIENT)
Dept: GASTROENTEROLOGY | Facility: CLINIC | Age: 37
End: 2022-02-15

## 2022-02-17 LAB — UREA BREATH TEST QL: NEGATIVE

## 2022-03-08 ENCOUNTER — APPOINTMENT (OUTPATIENT)
Dept: CARDIOLOGY | Facility: CLINIC | Age: 37
End: 2022-03-08
Payer: MEDICAID

## 2022-03-08 VITALS
BODY MASS INDEX: 29.41 KG/M2 | WEIGHT: 166 LBS | HEIGHT: 63 IN | DIASTOLIC BLOOD PRESSURE: 76 MMHG | OXYGEN SATURATION: 98 % | SYSTOLIC BLOOD PRESSURE: 124 MMHG | HEART RATE: 74 BPM

## 2022-03-08 PROCEDURE — 99214 OFFICE O/P EST MOD 30 MIN: CPT

## 2022-03-08 PROCEDURE — 93000 ELECTROCARDIOGRAM COMPLETE: CPT

## 2022-03-08 RX ORDER — OMEPRAZOLE 40 MG/1
40 CAPSULE, DELAYED RELEASE ORAL
Qty: 30 | Refills: 5 | Status: DISCONTINUED | COMMUNITY
Start: 2022-02-07 | End: 2022-03-08

## 2022-03-08 RX ORDER — LABETALOL HYDROCHLORIDE 300 MG/1
300 TABLET, FILM COATED ORAL
Qty: 180 | Refills: 3 | Status: ACTIVE | COMMUNITY
Start: 1900-01-01 | End: 1900-01-01

## 2022-03-13 LAB
ALDOSTERONE SERUM: 7.7 NG/DL
APPEARANCE: CLEAR
BILIRUBIN URINE: NEGATIVE
BLOOD URINE: NEGATIVE
COLOR: NORMAL
GLUCOSE QUALITATIVE U: NEGATIVE
KETONES URINE: NEGATIVE
LEUKOCYTE ESTERASE URINE: NEGATIVE
NITRITE URINE: NEGATIVE
PH URINE: 7
PROTEIN URINE: NEGATIVE
RENIN ACTIVITY, PLASMA: 0.66 NG/ML/HR
SPECIFIC GRAVITY URINE: 1.01
UROBILINOGEN URINE: NORMAL

## 2022-04-14 ENCOUNTER — APPOINTMENT (OUTPATIENT)
Dept: GASTROENTEROLOGY | Facility: CLINIC | Age: 37
End: 2022-04-14

## 2022-04-19 ENCOUNTER — APPOINTMENT (OUTPATIENT)
Dept: ANTEPARTUM | Facility: CLINIC | Age: 37
End: 2022-04-19
Payer: MEDICAID

## 2022-04-19 ENCOUNTER — ASOB RESULT (OUTPATIENT)
Age: 37
End: 2022-04-19

## 2022-04-19 PROCEDURE — 76830 TRANSVAGINAL US NON-OB: CPT

## 2022-04-19 PROCEDURE — 76856 US EXAM PELVIC COMPLETE: CPT | Mod: 59

## 2022-06-30 NOTE — ED STATDOCS - CARE PLAN
Impression: Combined forms of age-related cataract, bilateral: H25.813. Plan: Discussed diagnosis of cataracts with patient. Patient has no significant visual complaints at this time and is able to perform all their daily activities. We will re-evaluate cataract on return visit unless patient notes any changes sooner. Principal Discharge DX:	Acute non-recurrent sinusitis, unspecified location

## 2022-07-06 ENCOUNTER — APPOINTMENT (OUTPATIENT)
Dept: GASTROENTEROLOGY | Facility: CLINIC | Age: 37
End: 2022-07-06

## 2022-07-06 VITALS
BODY MASS INDEX: 30.3 KG/M2 | WEIGHT: 171 LBS | HEIGHT: 63 IN | SYSTOLIC BLOOD PRESSURE: 138 MMHG | HEART RATE: 76 BPM | DIASTOLIC BLOOD PRESSURE: 82 MMHG

## 2022-07-06 DIAGNOSIS — K59.09 OTHER CONSTIPATION: ICD-10-CM

## 2022-07-06 PROCEDURE — 99212 OFFICE O/P EST SF 10 MIN: CPT

## 2022-07-09 PROBLEM — K59.09 CHRONIC CONSTIPATION: Status: ACTIVE | Noted: 2022-07-09

## 2022-07-09 NOTE — PHYSICAL EXAM
[General Appearance - Alert] : alert [General Appearance - In No Acute Distress] : in no acute distress [Auscultation Breath Sounds / Voice Sounds] : lungs were clear to auscultation bilaterally [Heart Sounds] : normal S1 and S2 [Heart Rate And Rhythm] : heart rate was normal and rhythm regular [Heart Sounds Gallop] : no gallops [Murmurs] : no murmurs [Heart Sounds Pericardial Friction Rub] : no pericardial rub [Bowel Sounds] : normal bowel sounds [Abdomen Soft] : soft [] : no hepato-splenomegaly [Abdomen Mass (___ Cm)] : no abdominal mass palpated [FreeTextEntry1] : mildly tender epigastrium [Abnormal Walk] : normal gait [Nail Clubbing] : no clubbing  or cyanosis of the fingernails [Musculoskeletal - Swelling] : no joint swelling seen [Motor Tone] : muscle strength and tone were normal [Oriented To Time, Place, And Person] : oriented to person, place, and time [Impaired Insight] : insight and judgment were intact [Affect] : the affect was normal

## 2022-07-09 NOTE — HISTORY OF PRESENT ILLNESS
[de-identified] : 38yo female Korean speaking seen with \par \par She notes that dyspepsia better She now notes increased constipation\par OTC fiber no help\par

## 2022-07-12 ENCOUNTER — APPOINTMENT (OUTPATIENT)
Dept: CARDIOLOGY | Facility: CLINIC | Age: 37
End: 2022-07-12

## 2022-07-12 NOTE — HISTORY OF PRESENT ILLNESS
[FreeTextEntry1] : MALDONADO, CHANDA (MALDONADO, CHANDA E)\par 1985(37y)F\par \par 36 year old [place]an speaking female w/\par \par HTN\par gestational diabetes\par no prior cardiac history\par \par referred by PCP Dr. ELIAS Garduno Dec '21 \par for chest pain.  also here for BP management.\par \par here for FU.\par \par Last visit planned to reassess BP\par after 3-4 months & consider reducing labetalol\par eventually if labetalol reduced get 24 BP cuff.\par \par \par CARDIAC TESTING:\par *ECG Jan '22: NSR, NSST\par *Echo Jan '22: 55-60%, mild MR mild LAE\par *Coronary CTA Jan '22: normal coronaries, calcium score=0\par *Renal US, renin/ting, sleep study portable - all normal.\par \par \par A/P:\par \par \par

## 2022-07-14 NOTE — ASSESSMENT
[FreeTextEntry1] : A/P:\par \par Return in 3-4  months\par after lifestyle changes to reassess BP\par & consider reducing labetalol\par \par If able to d/c labetalol could get 24 hr BP cuff.\par ==============================\par addendum 7/14/'22\par \par Reviewed chart in preparation for  next visit.\par pt cancelled.\par ------------------\par h/o \par \par HTN\par gestational diabetes\par no prior cardiac history\par \par referred by PCP Dr. ELIAS Garduno Dec '21 \par for chest pain.  also here for BP management.\par here for FU.\par \par Last visit planned to reassess BP\par after 3-4 months & consider reducing labetalol\par eventually if labetalol reduced get 24 BP cuff.\par \par \par CARDIAC TESTING:\par *ECG Jan '22: NSR, NSST\par *Echo Jan '22: 55-60%, mild MR mild LAE\par *Coronary CTA Jan '22: normal coronaries, calcium score=0\par *Renal US, renin/ting, sleep study portable - all normal.\par \par

## 2022-07-14 NOTE — HISTORY OF PRESENT ILLNESS
[FreeTextEntry1] : ID# [id number]\par \par \par 36 year old Belarusian speaking female w/\par \par HTN\par gestational diabetes\par no prior cardiac history\par \par referred by PCP Dr. ELIAS Garduno for chest pain.\par also here for BP management.\par \par here for FU.\par continues to be chest pain free since last visit.\par Reviewed renal US, renin/ting, sleep study portable - all normal.\par \par pt's father had HTN, only on one med.\par age of  diagnosis w/ HTN unclear.\par \par Discussed weight loss & salt reduction in diet\par & if pt loses 10 pds & has reduction in salt in urine\par could reduce labetalol\par \par eats out once 1-2 month

## 2023-05-02 ENCOUNTER — ASOB RESULT (OUTPATIENT)
Age: 38
End: 2023-05-02

## 2023-05-02 ENCOUNTER — APPOINTMENT (OUTPATIENT)
Dept: ANTEPARTUM | Facility: CLINIC | Age: 38
End: 2023-05-02
Payer: MEDICAID

## 2023-05-02 PROCEDURE — 76857 US EXAM PELVIC LIMITED: CPT | Mod: 59

## 2023-05-02 PROCEDURE — 76830 TRANSVAGINAL US NON-OB: CPT

## 2023-10-28 ENCOUNTER — NON-APPOINTMENT (OUTPATIENT)
Age: 38
End: 2023-10-28

## 2023-10-31 ENCOUNTER — APPOINTMENT (OUTPATIENT)
Dept: ANTEPARTUM | Facility: CLINIC | Age: 38
End: 2023-10-31
Payer: COMMERCIAL

## 2023-10-31 ENCOUNTER — ASOB RESULT (OUTPATIENT)
Age: 38
End: 2023-10-31

## 2023-10-31 PROCEDURE — 76830 TRANSVAGINAL US NON-OB: CPT

## 2023-10-31 PROCEDURE — 76856 US EXAM PELVIC COMPLETE: CPT | Mod: 59

## 2023-11-22 ENCOUNTER — NON-APPOINTMENT (OUTPATIENT)
Age: 38
End: 2023-11-22

## 2023-12-01 ENCOUNTER — RX RENEWAL (OUTPATIENT)
Age: 38
End: 2023-12-01

## 2024-01-08 ENCOUNTER — NON-APPOINTMENT (OUTPATIENT)
Age: 39
End: 2024-01-08

## 2024-01-30 ENCOUNTER — EMERGENCY (EMERGENCY)
Facility: HOSPITAL | Age: 39
LOS: 0 days | Discharge: HOME | End: 2024-01-30
Attending: EMERGENCY MEDICINE
Payer: COMMERCIAL

## 2024-01-30 VITALS — WEIGHT: 149.91 LBS | HEIGHT: 64 IN

## 2024-01-30 VITALS
TEMPERATURE: 99 F | SYSTOLIC BLOOD PRESSURE: 148 MMHG | DIASTOLIC BLOOD PRESSURE: 72 MMHG | RESPIRATION RATE: 18 BRPM | HEART RATE: 68 BPM | OXYGEN SATURATION: 100 %

## 2024-01-30 DIAGNOSIS — I10 ESSENTIAL (PRIMARY) HYPERTENSION: ICD-10-CM

## 2024-01-30 DIAGNOSIS — Z90.49 ACQUIRED ABSENCE OF OTHER SPECIFIED PARTS OF DIGESTIVE TRACT: Chronic | ICD-10-CM

## 2024-01-30 DIAGNOSIS — R35.0 FREQUENCY OF MICTURITION: ICD-10-CM

## 2024-01-30 DIAGNOSIS — R30.0 DYSURIA: ICD-10-CM

## 2024-01-30 DIAGNOSIS — N39.0 URINARY TRACT INFECTION, SITE NOT SPECIFIED: ICD-10-CM

## 2024-01-30 LAB
APPEARANCE UR: ABNORMAL
BACTERIA # UR AUTO: ABNORMAL /HPF
BILIRUB UR-MCNC: NEGATIVE — SIGNIFICANT CHANGE UP
CAST: 0 /LPF — SIGNIFICANT CHANGE UP (ref 0–4)
COLOR SPEC: ABNORMAL
DIFF PNL FLD: ABNORMAL
GLUCOSE UR QL: NEGATIVE MG/DL — SIGNIFICANT CHANGE UP
KETONES UR-MCNC: NEGATIVE MG/DL — SIGNIFICANT CHANGE UP
LEUKOCYTE ESTERASE UR-ACNC: ABNORMAL
NITRITE UR-MCNC: NEGATIVE — SIGNIFICANT CHANGE UP
PH UR: 6.5 — SIGNIFICANT CHANGE UP (ref 5–8)
PROT UR-MCNC: 100 MG/DL
RBC CASTS # UR COMP ASSIST: 24 /HPF — HIGH (ref 0–4)
SP GR SPEC: <1.005 — LOW (ref 1–1.03)
SQUAMOUS # UR AUTO: 0 /HPF — SIGNIFICANT CHANGE UP (ref 0–5)
UROBILINOGEN FLD QL: 0.2 MG/DL — SIGNIFICANT CHANGE UP (ref 0.2–1)
WBC UR QL: 219 /HPF — HIGH (ref 0–5)

## 2024-01-30 PROCEDURE — 87186 SC STD MICRODIL/AGAR DIL: CPT

## 2024-01-30 PROCEDURE — 99284 EMERGENCY DEPT VISIT MOD MDM: CPT | Mod: 25

## 2024-01-30 PROCEDURE — 81001 URINALYSIS AUTO W/SCOPE: CPT

## 2024-01-30 PROCEDURE — 87086 URINE CULTURE/COLONY COUNT: CPT

## 2024-01-30 PROCEDURE — 99283 EMERGENCY DEPT VISIT LOW MDM: CPT

## 2024-01-30 RX ORDER — IBUPROFEN 200 MG
1 TABLET ORAL
Qty: 30 | Refills: 0
Start: 2024-01-30

## 2024-01-30 RX ORDER — IBUPROFEN 200 MG
600 TABLET ORAL ONCE
Refills: 0 | Status: COMPLETED | OUTPATIENT
Start: 2024-01-30 | End: 2024-01-30

## 2024-01-30 RX ORDER — PHENAZOPYRIDINE HCL 100 MG
200 TABLET ORAL ONCE
Refills: 0 | Status: COMPLETED | OUTPATIENT
Start: 2024-01-30 | End: 2024-01-30

## 2024-01-30 RX ORDER — CEFUROXIME AXETIL 250 MG
500 TABLET ORAL ONCE
Refills: 0 | Status: COMPLETED | OUTPATIENT
Start: 2024-01-30 | End: 2024-01-30

## 2024-01-30 RX ORDER — CEFUROXIME AXETIL 250 MG
1 TABLET ORAL
Qty: 20 | Refills: 0
Start: 2024-01-30 | End: 2024-02-08

## 2024-01-30 RX ORDER — PHENAZOPYRIDINE HCL 100 MG
1 TABLET ORAL
Qty: 6 | Refills: 0
Start: 2024-01-30 | End: 2024-01-31

## 2024-01-30 RX ADMIN — Medication 600 MILLIGRAM(S): at 02:26

## 2024-01-30 RX ADMIN — Medication 200 MILLIGRAM(S): at 02:26

## 2024-01-30 RX ADMIN — Medication 500 MILLIGRAM(S): at 02:27

## 2024-01-30 NOTE — ED ADULT NURSE NOTE - OBJECTIVE STATEMENT
Pt presents to ED w/ suprapubic pain, burning, dysuria and urinary frequency for 24 hours. Pt notes over the last few hours she has developed pink tinged urine. Denies n/v or fevers but endorses chills. LMP 2 weeks ago.

## 2024-01-30 NOTE — ED ADULT NURSE NOTE - CHIEF COMPLAINT QUOTE
Pt presents to [facility] [id number] of abd pain, hematuria x 1 day. Pt endorsing suprapubic pain and pink urine. No meds taken PTA. LMP 2 weeks ago. NKDA.

## 2024-01-30 NOTE — ED PROVIDER NOTE - OBJECTIVE STATEMENT
39-year-old female with history of hypertension, cholecystectomy, tubal ligation presents for evaluation of suprapubic discomfort described as burning and dysuria, frequency and urgency for the past 24 hours.  Patient notes that over the past few hours , she has hematuria.  It is described as a dark pink urine.  Patient notes some dull bilateral lower back pain but no flank pain.  Patient denies any nausea or vomiting.  Patient denies any fever but feels chills.  Patient denies being on antibiotics for any reason over the last few months.   Her last menstrual period was 2 weeks ago.

## 2024-01-30 NOTE — ED ADULT TRIAGE NOTE - CHIEF COMPLAINT QUOTE
Pt presents to [facility] [id number] of abd pain, hematuria x 1 day. Pt endorsing suprapubic pain and pink urine. No meds taken PTA. LMP 2 weeks ago. NKDA.

## 2024-01-30 NOTE — ED PROVIDER NOTE - PATIENT PORTAL LINK FT
You can access the FollowMyHealth Patient Portal offered by [facility] by registering at the following website: [email or web address]. By joining [employer]’s FollowMyHealth portal, you will also be able to view your health information using other applications (apps) compatible with our system.

## 2024-01-30 NOTE — ED PROVIDER NOTE - NSFOLLOWUPINSTRUCTIONS_ED_ALL_ED_FT
Infección del tracto urinario    Yimi infección del tracto urinario (ITU) es yimi infección de cualquier parte del tracto urinario, que incluye los riñones, los uréteres, la vejiga y la uretra. Los factores de riesgo incluyen ignorar la necesidad de orinar, limpiarse de atrás hacia adelante si es galindo, ser un hombre no circuncidado y tener diabetes o un sistema inmunológico débil. Los síntomas incluyen micción frecuente, dolor o ardor al orinar, orina con mal olor, orina turbia, dolor en la parte inferior del abdomen, monty en la orina y fiebre. Si le recetaron un antibiótico, tómelo según las indicaciones de lombardo proveedor de atención médica. No deje de brock el antibiótico incluso si comienza a sentirse mejor.    BUSQUE ATENCIÓN MÉDICA INMEDIATA SI TIENE ALGUNO DE LOS SIGUIENTES SÍNTOMAS: dolor intenso de espalda o abdominal, fiebre, incapacidad para retener líquidos o medicamentos, mareos/aturdimiento o un cambio en el estado mental.

## 2024-01-30 NOTE — ED PROVIDER NOTE - CLINICAL SUMMARY MEDICAL DECISION MAKING FREE TEXT BOX
39-year-old female with history of hypertension, cholecystectomy, tubal ligation presents for evaluation of suprapubic discomfort described as burning and dysuria, frequency and urgency for the past 24 hours.  Patient notes that over the past few hours , she has hematuria.  It is described as a dark pink urine.  Patient notes some dull bilateral lower back pain but no flank pain.  Patient denies any nausea or vomiting.  Patient denies any fever but feels chills.  Patient denies being on antibiotics for any reason over the last few months.   Her last menstrual period was 2 weeks ago.   patient's history and physical is most consistent with hemorrhagic cystitis.  The patient does not have any significant CVA tenderness and her vital signs do not meet SIRS criteria.  Will get urinalysis with culture, urine pregnancy test and treat with Keflex 500 mg twice daily for 10 days if positive for UTI.  Will also treat pain with ibuprofen and Pyridium.

## 2024-04-01 ENCOUNTER — NON-APPOINTMENT (OUTPATIENT)
Age: 39
End: 2024-04-01

## 2024-05-24 ENCOUNTER — NON-APPOINTMENT (OUTPATIENT)
Age: 39
End: 2024-05-24

## 2024-05-27 ENCOUNTER — EMERGENCY (EMERGENCY)
Facility: HOSPITAL | Age: 39
LOS: 0 days | Discharge: HOME | End: 2024-05-28
Attending: STUDENT IN AN ORGANIZED HEALTH CARE EDUCATION/TRAINING PROGRAM
Payer: COMMERCIAL

## 2024-05-27 VITALS
SYSTOLIC BLOOD PRESSURE: 137 MMHG | OXYGEN SATURATION: 99 % | HEART RATE: 71 BPM | RESPIRATION RATE: 19 BRPM | TEMPERATURE: 98 F | WEIGHT: 172.18 LBS | DIASTOLIC BLOOD PRESSURE: 71 MMHG

## 2024-05-27 DIAGNOSIS — Z90.49 ACQUIRED ABSENCE OF OTHER SPECIFIED PARTS OF DIGESTIVE TRACT: ICD-10-CM

## 2024-05-27 DIAGNOSIS — M54.9 DORSALGIA, UNSPECIFIED: ICD-10-CM

## 2024-05-27 DIAGNOSIS — R10.32 LEFT LOWER QUADRANT PAIN: ICD-10-CM

## 2024-05-27 DIAGNOSIS — I10 ESSENTIAL (PRIMARY) HYPERTENSION: ICD-10-CM

## 2024-05-27 DIAGNOSIS — R11.0 NAUSEA: ICD-10-CM

## 2024-05-27 DIAGNOSIS — Z90.49 ACQUIRED ABSENCE OF OTHER SPECIFIED PARTS OF DIGESTIVE TRACT: Chronic | ICD-10-CM

## 2024-05-27 PROCEDURE — 81003 URINALYSIS AUTO W/O SCOPE: CPT

## 2024-05-27 PROCEDURE — 84702 CHORIONIC GONADOTROPIN TEST: CPT

## 2024-05-27 PROCEDURE — 83690 ASSAY OF LIPASE: CPT

## 2024-05-27 PROCEDURE — 85025 COMPLETE CBC W/AUTO DIFF WBC: CPT

## 2024-05-27 PROCEDURE — 99285 EMERGENCY DEPT VISIT HI MDM: CPT | Mod: 25

## 2024-05-27 PROCEDURE — 36415 COLL VENOUS BLD VENIPUNCTURE: CPT

## 2024-05-27 PROCEDURE — 93975 VASCULAR STUDY: CPT

## 2024-05-27 PROCEDURE — 80053 COMPREHEN METABOLIC PANEL: CPT

## 2024-05-27 PROCEDURE — 96374 THER/PROPH/DIAG INJ IV PUSH: CPT | Mod: XU

## 2024-05-27 PROCEDURE — 87086 URINE CULTURE/COLONY COUNT: CPT

## 2024-05-27 PROCEDURE — 99285 EMERGENCY DEPT VISIT HI MDM: CPT

## 2024-05-27 PROCEDURE — 74177 CT ABD & PELVIS W/CONTRAST: CPT | Mod: MC

## 2024-05-27 PROCEDURE — 76830 TRANSVAGINAL US NON-OB: CPT

## 2024-05-27 RX ORDER — MORPHINE SULFATE 50 MG/1
2 CAPSULE, EXTENDED RELEASE ORAL ONCE
Refills: 0 | Status: DISCONTINUED | OUTPATIENT
Start: 2024-05-27 | End: 2024-05-27

## 2024-05-27 RX ORDER — SODIUM CHLORIDE 9 MG/ML
1000 INJECTION INTRAMUSCULAR; INTRAVENOUS; SUBCUTANEOUS ONCE
Refills: 0 | Status: COMPLETED | OUTPATIENT
Start: 2024-05-27 | End: 2024-05-27

## 2024-05-27 NOTE — ED PROVIDER NOTE - DIFFERENTIAL DIAGNOSIS
Differential Diagnosis kidney stone, ovarian torsion, msk radiculopathy kidney stone, ovarian torsion, msk radiculopathy, pyelo, ectopic

## 2024-05-27 NOTE — ED PROVIDER NOTE - PHYSICAL EXAMINATION
Gen: Well appearing in NAD   Head: NC/AT  Neck: trachea midline  Resp:  No distress  Ext: no deformities  Abd: tenderness to LLQ/L pelvis, L CVAT  Neuro:  A&O appears non focal  Skin:  Warm and dry as visualized  Psych:  Normal affect and mood

## 2024-05-27 NOTE — ED ADULT TRIAGE NOTE - CHIEF COMPLAINT QUOTE
pt presents to ED with LLQ  abdominal pain radiating to back x 3 weeks. pt reports nausea, denies vomiting/fever diarrhea or urinary symptoms. pt also reports left knee pain denies trauma or injury. interrupter ID [id number] Tobi.

## 2024-05-27 NOTE — ED PROVIDER NOTE - PATIENT PORTAL LINK FT
You can access the FollowMyHealth Patient Portal offered by [facility] by registering at the following website: [email or web address]. By joining [employer]’s FollowMyHealth portal, you will also be able to view your health information using other applications (apps) compatible with our system.

## 2024-05-27 NOTE — ED ADULT TRIAGE NOTE - ESI TRIAGE ACUITY LEVEL, MLM
Subjective:      Patient ID: Queenie Puckett 1961 is a 63 y.o. female here for evaluation. Chief Complaint   Patient presents with    3 Month Follow-Up    Colonoscopy     discuss      Smoke: None    Colon Cancer Screening - 2014 in [address] - denied    Alf Mcknight July 15th  CARDIO - Dr. Juliann Davis    Following with Waunita Dakin regarding abnormal CT and PET SCAN. Plan is for biopsy of this nodule. NUC PET SCAN  IMPRESSION   IMPRESSION: Hypermetabolic right upper lobe nodule, concerning for malignancy. No definite other suspicious hypermetabolic foci or lymphadenopathy   identified. CT Scan 4/14/21  IMPRESSION:     1.   Suspicious spiculated right upper lobe nodule measuring up to 1.3 cm. Surrounding groundglass is nonspecific and may represent lepidic growth, focal   airspace disease or hemorrhage. PET/CT or tissue sampling is recommended as   well as comparison to the prior imaging given history of right upper lobe mass   in 2019 indicated under given clinical history. 2.  No adenopathy. Hx of PACER in late 90s. Seen CARDIO in mid 2010s per notes that pacer was non-operational.  Heart was pacing without. Pacer was left due no issues despite non-functional status   SEEN CARDIO with no concern. High anxiety and PTSD. Hx of schizoaffective disorder. On Prozac  20 mg and Xanax BID    Right knee arthritis. Hx of joint INJ every 6 months. Was also following with a Back Doctor and PAIN DOCTOR. She is holding off on ORTHO until lungs her other medical issues. She is seeing RHEUM at [facility] in July 2021    Vitals:    06/01/21 1430   BP: 134/80   Pulse: 85   Resp: 16   SpO2: 96%       Labs reviewed.      No results found for: LABA1C  No results found for: EAG    No components found for: CHLPL  No results found for: TRIG  No results found for: HDL  No results found for: [address]  No results found for: LABVLDL      Chemistry        Component Value Date/Time    BUN 12 03/03/2021 1426    CREATININE 0.7 03/03/2021 1426    No results found for: CALCIUM, ALKPHOS, AST, ALT, BILITOT         No results found for: TSH, G8FGZGD, R7MBPUI, THYROIDAB    Lab Results   Component Value Date    WBC 4.3 (L) 03/03/2021    HGB 12.1 03/03/2021    HCT 38.4 03/03/2021    MCV 84.2 03/03/2021     03/03/2021         Health Maintenance   Topic Date Due    Pneumococcal 0-64 years Vaccine (1 of 4 - PCV13) Never done    DTaP/Tdap/Td vaccine (1 - Tdap) Never done    Cervical cancer screen  Never done    Diabetes screen  Never done    Breast cancer screen  Never done    Colon cancer screen colonoscopy  Never done    Flu vaccine (Season Ended) 09/01/2021    Lipid screen  02/04/2022    COVID-19 Vaccine  Completed    Hepatitis C screen  Completed    HIV screen  Completed    Hepatitis A vaccine  Aged Out    Hepatitis B vaccine  Aged Out    Hib vaccine  Aged Out    Meningococcal (ACWY) vaccine  Aged [employer] History   Administered Date(s) Administered    COVID-19, Moderna, PF, 100mcg/0.5mL 02/26/2021, 03/26/2021       Review of Systems   Constitutional: Negative for chills and fever. HENT: Negative. Respiratory: Positive for cough. Negative for shortness of breath. Cardiovascular: Negative for chest pain. Gastrointestinal: Negative for abdominal pain and nausea. Musculoskeletal: Positive for arthralgias and back pain. Skin: Negative for rash. Neurological: Negative for dizziness, light-headedness and headaches. Psychiatric/Behavioral: Positive for sleep disturbance. The patient is nervous/anxious. Objective:   Physical Exam  Constitutional:       General: She is not in acute distress. Eyes:      Pupils: Pupils are equal, round, and reactive to light. Cardiovascular:      Rate and Rhythm: Normal rate and regular rhythm. Heart sounds: No murmur heard. Pulmonary:      Effort: Pulmonary effort is normal.      Breath sounds: Normal breath sounds. No wheezing. Abdominal:      General: Bowel sounds are normal. There is no distension. Palpations: Abdomen is soft. Tenderness: There is no abdominal tenderness. Musculoskeletal:      Cervical back: Normal range of motion and neck supple. Lumbar back: Decreased range of motion. Right knee: Decreased range of motion. Tenderness present. Skin:     General: Skin is warm and dry. Findings: No rash. Assessment:       Diagnosis Orders   1. Systemic lupus erythematosus (SLE) with pericarditis, unspecified SLE type ([address]     2. Coughing up blood     3. Right upper lobe consolidation (HCC)     4. Pulmonary nodule     5. Arthralgia of multiple joints     6. Generalized anxiety disorder     7. PTSD (post-traumatic stress disorder)  ALPRAZolam (XANAX) 0.5 MG tablet   8. Vitamin D deficiency     9. Arthritis of right knee  acetaminophen (TYLENOL) 500 MG tablet    meloxicam (MOBIC) 15 MG tablet   10. Chronic back pain, unspecified back location, unspecified back pain laterality  acetaminophen (TYLENOL) 500 MG tablet    meloxicam (MOBIC) 15 MG tablet           Plan:      Chronic conditions stable  Labs and Imaging reviewed from [facility]   - PET scan POS nodule with no other concerning features  Follow up with Specialists - PULM, RHEUM, CARDIO  Refills as above   - MObic Daily for arthralgia  Labs as above  Denied Immunizations  RTO in 6 months                  Current Outpatient Medications   Medication Sig Dispense Refill    acetaminophen (TYLENOL) 500 MG tablet Take 1-2 tablets by mouth every 8 hours as needed for Pain Maximum dose- 8 tablets/24 hours.  120 tablet 3    meloxicam (MOBIC) 15 MG tablet Take 1 tablet by mouth daily 30 tablet 3    doxepin (SINEQUAN) 50 MG capsule Take 1 capsule by mouth nightly 30 capsule 11    FLUoxetine (PROZAC) 20 MG capsule       rosuvastatin (CRESTOR) 10 MG tablet take 1 tablet by mouth at bedtime      predniSONE (DELTASONE) 20 MG tablet Take 20 mg by mouth 2 times daily      famotidine (PEPCID) 40 MG tablet Take 40 mg by mouth daily      blood glucose test strips (FREESTYLE LITE) strip 1 each by In Vitro route daily As needed  Freestyle Lite      ALPRAZolam (XANAX) 0.5 MG tablet take 1 tablet by mouth twice a day       No current facility-administered medications for this visit. 3

## 2024-05-27 NOTE — ED PROVIDER NOTE - NSFOLLOWUPINSTRUCTIONS_ED_ALL_ED_FT
** Your bloodwork, ultrasound and CT did not suggest that you have an acute life-threatening medical issue.     ** Take over the counter Tylenol or Ibuprofen for pain -- follow dosing directions on original bottle.    ** Follow up with your primary care doctor in the next 72 hours.     ** Go to the nearest Emergency Department if you experience any new or concerning symptoms, such as:   - worsening pain  - chest pain  - difficulty breathing  - passing out  - unable to eat or drink  - unable to move or feel part of your body  - fever, chills

## 2024-05-27 NOTE — ED PROVIDER NOTE - OBJECTIVE STATEMENT
used, ID#[id number]. 40 y/o F with PMHx of miscarriage, uterine ablation to remove a growth 1 year ago, HTN, cholecystectomy presents to the ED c/o LLQ pain x3 weeks with associated nausea. Pt came in today due to worsening pain and radiation to her back and L leg. Pain in her back is exacerbated by turning her upper body. Denies having pain like this in the past. Denies vomiting, dysuria, decreased/increased urine output.

## 2024-05-28 VITALS
TEMPERATURE: 98 F | DIASTOLIC BLOOD PRESSURE: 70 MMHG | HEART RATE: 72 BPM | RESPIRATION RATE: 18 BRPM | OXYGEN SATURATION: 98 % | SYSTOLIC BLOOD PRESSURE: 118 MMHG

## 2024-05-28 LAB
ALBUMIN SERPL ELPH-MCNC: 3.8 G/DL — SIGNIFICANT CHANGE UP (ref 3.3–5)
ALP SERPL-CCNC: 67 U/L — SIGNIFICANT CHANGE UP (ref 40–120)
ALT FLD-CCNC: 29 U/L — SIGNIFICANT CHANGE UP (ref 12–78)
ANION GAP SERPL CALC-SCNC: 5 MMOL/L — SIGNIFICANT CHANGE UP (ref 5–17)
APPEARANCE UR: CLEAR — SIGNIFICANT CHANGE UP
AST SERPL-CCNC: 17 U/L — SIGNIFICANT CHANGE UP (ref 15–37)
BASOPHILS # BLD AUTO: 0.02 K/UL — SIGNIFICANT CHANGE UP (ref 0–0.2)
BASOPHILS NFR BLD AUTO: 0.2 % — SIGNIFICANT CHANGE UP (ref 0–2)
BILIRUB SERPL-MCNC: 0.2 MG/DL — SIGNIFICANT CHANGE UP (ref 0.2–1.2)
BILIRUB UR-MCNC: NEGATIVE — SIGNIFICANT CHANGE UP
BUN SERPL-MCNC: 15 MG/DL — SIGNIFICANT CHANGE UP (ref 7–23)
CALCIUM SERPL-MCNC: 9 MG/DL — SIGNIFICANT CHANGE UP (ref 8.5–10.1)
CHLORIDE SERPL-SCNC: 109 MMOL/L — HIGH (ref 96–108)
CO2 SERPL-SCNC: 25 MMOL/L — SIGNIFICANT CHANGE UP (ref 22–31)
COLOR SPEC: YELLOW — SIGNIFICANT CHANGE UP
CREAT SERPL-MCNC: 0.69 MG/DL — SIGNIFICANT CHANGE UP (ref 0.5–1.3)
CULTURE RESULTS: SIGNIFICANT CHANGE UP
DIFF PNL FLD: NEGATIVE — SIGNIFICANT CHANGE UP
EGFR: 114 ML/MIN/1.73M2 — SIGNIFICANT CHANGE UP
EOSINOPHIL # BLD AUTO: 0.17 K/UL — SIGNIFICANT CHANGE UP (ref 0–0.5)
EOSINOPHIL NFR BLD AUTO: 2 % — SIGNIFICANT CHANGE UP (ref 0–6)
GLUCOSE SERPL-MCNC: 105 MG/DL — HIGH (ref 70–99)
GLUCOSE UR QL: NEGATIVE MG/DL — SIGNIFICANT CHANGE UP
HCG SERPL-ACNC: <1 MIU/ML — SIGNIFICANT CHANGE UP
HCT VFR BLD CALC: 36.4 % — SIGNIFICANT CHANGE UP (ref 34.5–45)
HGB BLD-MCNC: 12.3 G/DL — SIGNIFICANT CHANGE UP (ref 11.5–15.5)
IMM GRANULOCYTES NFR BLD AUTO: 0.8 % — SIGNIFICANT CHANGE UP (ref 0–0.9)
KETONES UR-MCNC: NEGATIVE MG/DL — SIGNIFICANT CHANGE UP
LEUKOCYTE ESTERASE UR-ACNC: NEGATIVE — SIGNIFICANT CHANGE UP
LIDOCAIN IGE QN: 40 U/L — SIGNIFICANT CHANGE UP (ref 13–75)
LYMPHOCYTES # BLD AUTO: 3.5 K/UL — HIGH (ref 1–3.3)
LYMPHOCYTES # BLD AUTO: 40.2 % — SIGNIFICANT CHANGE UP (ref 13–44)
MCHC RBC-ENTMCNC: 28.8 PG — SIGNIFICANT CHANGE UP (ref 27–34)
MCHC RBC-ENTMCNC: 33.8 GM/DL — SIGNIFICANT CHANGE UP (ref 32–36)
MCV RBC AUTO: 85.2 FL — SIGNIFICANT CHANGE UP (ref 80–100)
MONOCYTES # BLD AUTO: 0.63 K/UL — SIGNIFICANT CHANGE UP (ref 0–0.9)
MONOCYTES NFR BLD AUTO: 7.2 % — SIGNIFICANT CHANGE UP (ref 2–14)
NEUTROPHILS # BLD AUTO: 4.31 K/UL — SIGNIFICANT CHANGE UP (ref 1.8–7.4)
NEUTROPHILS NFR BLD AUTO: 49.6 % — SIGNIFICANT CHANGE UP (ref 43–77)
NITRITE UR-MCNC: NEGATIVE — SIGNIFICANT CHANGE UP
PH UR: 6 — SIGNIFICANT CHANGE UP (ref 5–8)
PLATELET # BLD AUTO: 247 K/UL — SIGNIFICANT CHANGE UP (ref 150–400)
POTASSIUM SERPL-MCNC: 3.7 MMOL/L — SIGNIFICANT CHANGE UP (ref 3.5–5.3)
POTASSIUM SERPL-SCNC: 3.7 MMOL/L — SIGNIFICANT CHANGE UP (ref 3.5–5.3)
PROT SERPL-MCNC: 7.9 GM/DL — SIGNIFICANT CHANGE UP (ref 6–8.3)
PROT UR-MCNC: NEGATIVE MG/DL — SIGNIFICANT CHANGE UP
RBC # BLD: 4.27 M/UL — SIGNIFICANT CHANGE UP (ref 3.8–5.2)
RBC # FLD: 13.6 % — SIGNIFICANT CHANGE UP (ref 10.3–14.5)
SODIUM SERPL-SCNC: 139 MMOL/L — SIGNIFICANT CHANGE UP (ref 135–145)
SP GR SPEC: 1.01 — SIGNIFICANT CHANGE UP (ref 1–1.03)
SPECIMEN SOURCE: SIGNIFICANT CHANGE UP
UROBILINOGEN FLD QL: 0.2 MG/DL — SIGNIFICANT CHANGE UP (ref 0.2–1)
WBC # BLD: 8.7 K/UL — SIGNIFICANT CHANGE UP (ref 3.8–10.5)
WBC # FLD AUTO: 8.7 K/UL — SIGNIFICANT CHANGE UP (ref 3.8–10.5)

## 2024-05-28 PROCEDURE — 76830 TRANSVAGINAL US NON-OB: CPT | Mod: 26

## 2024-05-28 PROCEDURE — 74177 CT ABD & PELVIS W/CONTRAST: CPT | Mod: 26,MC

## 2024-05-28 PROCEDURE — 93975 VASCULAR STUDY: CPT | Mod: 26

## 2024-05-28 RX ADMIN — SODIUM CHLORIDE 1000 MILLILITER(S): 9 INJECTION INTRAMUSCULAR; INTRAVENOUS; SUBCUTANEOUS at 00:27

## 2024-05-28 RX ADMIN — MORPHINE SULFATE 2 MILLIGRAM(S): 50 CAPSULE, EXTENDED RELEASE ORAL at 01:27

## 2024-05-28 NOTE — ED ADULT NURSE NOTE - NSFALLUNIVINTERV_ED_ALL_ED
Bed/Stretcher in lowest position, wheels locked, appropriate side rails in place/Call bell, personal items and telephone in reach/Instruct patient to call for assistance before getting out of bed/chair/stretcher/Non-slip footwear applied when patient is off stretcher/[place] to call system/Physically safe environment - no spills, clutter or unnecessary equipment/Purposeful proactive rounding/Room/bathroom lighting operational, light cord in reach

## 2024-05-28 NOTE — ED ADULT NURSE NOTE - CHIEF COMPLAINT QUOTE
pt presents to ED with LLQ  abdominal pain radiating to back x 3 weeks. pt reports nausea, denies vomiting/fever diarrhea or urinary symptoms. pt also reports left knee pain denies trauma or injury. interrupter ID [id number] Tobi.

## 2024-05-28 NOTE — ED ADULT NURSE NOTE - OBJECTIVE STATEMENT
Patient presents to ER complaining of LLQ pain for 3 weeks. endorses nausea, denies vomiting and diarrhea. Patient reports pain worsened today. No other complaints at this time. No apparent distress noted. A&Ox4, ambulatory at baseline. hx of HTN

## 2024-06-27 NOTE — ED ADULT TRIAGE NOTE - ARRIVAL FROM
Approving, but needs appt for additional refills.    [facility]    Medicine Progress Note - Hospitalist Service, GOLD TEAM 16    Date of Admission:  5/16/2023    Assessment & Plan   Jeanine Schuler is a 60 yo female w/ past medical of HTN, HFpEF (LV EF 45-50%), pulmonary HTN, paroxysmal afib on chronic anticoagulation, interstitial lung disease, sarcoidosis, mixed connective tissue disease, Raynaud's, and chronic dysphagia who was admitted on 5/16/2023 with failure to thrive and found to have multiple R sided pulmonary emboli and bilateral lower extremity DVT's. She has been convalescing on the general medical castillo.  She was transferred to the ICU 6/11 for worsening SOB and increased O2 needs.  CT PE protocol done 6/11 morning showed large bilateral pleural effusion, volume overload, and concern for pneumonia.     #  Interstitial lung disease  #  Sarcoidosis  #  Acute provoked right lung PE on 5/16, this admission  #  Hospital acquired pneumonia  #  Large bilateral pleural effusions  #  Pulmonary edema  #  Acute hypoxic respiratory failure   ---   Pulm Consult (6/6): recommended steroid, outpatient follow up in ILD clinic   ---   S/p b/l thoracentesis (6/12):  Right large loculated effusion with initially only 30 ml out so chest tube inserted, once to floor began draining therefore didn't inject TPA, Left side with 700 ml out.   ---   On RA w/ SpO2 > 94-96%  ---   DuoNeb 4x daily  ---   Aggressive Pulmonary Hygiene  ---   Continue CT to - 20 wall suction  ---   Once CT output is < 75 mL in a day, can change to water seal and consider removal  ---   Will ask ICU or pulm team manage pt's chest tube  ---   Will get follow up CXR today  ---   6/11 blood cx x 2:  NGTD   ---   6/11 MRSA swab:  Negative  ---   6/11 Legionella and streptococcus negative  ---   Antibiotics treatment as follows!    Vancomycin x 2 doses   Zosyn: 6/11 - 6/14   Unasyn: 6/14 - 6/16   ---   Continue Apixiban 5 mg po bid for treatment of acute  "provoked (sedantary life style)  right lung PE     #  Paroxysmal atrial fibrillation on chronic anticoagulation  #  HTN  #  HFpEF  #  Pulmonary HTN  #  R heart stain based on CT chest   #  Elevated troponin   ---   TTE 6/3/23:  EF 45-50%, no RWMA, normal RV size/fucntion, pulmonary artery systolic pressure normal, no significant valvular heart disease, no effusion  ---   Repeat TTE 6/12/23 w minimal change  ---   Continue PTA Amiodarone 200 mg PO daily for rhythm control  ---   Continue holding metoprolol XL 50 mg daily, restart at lower dose when clinically appropriate   ---   Continue IV metoprolol 5 mg prn HR > 120  ---   Cardiology consulted, appreciate recs  ---   Keep MAP > 65     #  Dysphagia, resolved  #  Severe protein calorie malnutrition   #  Constipation   #  History of gastric bypass surgery   ---   GI previous consulted, dysmotility esophagogram done 6/7 but was limited due to patients mobility but no major strictures noted, Recs were to continue PPI, limit opioids and anticholinergic meds as able  ---   Outpatient follow-up in [facility] for chronic dysphagia, esophageal dysmotility in the setting of scleroderma: \"[facility] phone: [phone], option 1 for clinic scheduling\"  ---   Diet: Regular Textures + Thin Liquids  ---   SLP recommending no further therapy, currently on regular diet  ---   GI placed PEG on [place] on 6/14  ---   Dietician consulted, appreciate recs  ---   TF ordered, goal rate 40 ml/hr but pt is tolerating tube feed because of N/V  ---   Protein supplement  ---   Bowel Regimen: Senna + Miralax  ---   Continue Metoclopramide q6 hrs for gastric motility  ---   Continue IV Zofran and IV Ativan prn N/V  ---   RD following     #  Hypervolemia  #  Anasarca   #  Hypomagnesemia  #  Hypokalemia  ---   Daily Weights  ---   Strict I&Os  ---   Continue foleyl catheter   ---   I/O net negative 7,2 L since admit  ---   RN Managed Electrolyte " Replacement: HIGH  ---   S/p Bumex drip but currently on Bumex 2 mg po tid  ---   Continue Mg Ox 400 daily for chronic hypomagnesemia     #  Acute pain  ---   Tylenol, Oxycodone, Dilaudid, Lidoderm patch and Gabapentin 100 mg PO at bedtime     #  Anemia  #  Chronic anticoagulation   #  Acute provoked b/l LE DVT on 5/16, this admission  ---   Apixaban 5 mg PO BID     #  Mixed connective tissue disease  #  Scleroderma  #  Severe Raynaud's   #  Chronic steroid use  #  Hypoglycemia, resolved  ----   D/t pts severe Raynaud's pt blood glucoses via fingerstick inaccurate, stopped BG, monitor BG via bmp, if concern for hypoglycemia, needs stat lab draw BG  ---   Rheum consulted, appreciate recs  ---   Continue PTA Prednisone 15 mg PO daily     #  Generalized weakness and deconditioning   #  Failure to thrive   ---   PT/OT following  ---   Mobilize, out of bed minimum of 3 times/day     #  Right breast skin thickening and nipple retraction on CT (incidental CT finding)  ---   Outpatient mammography   ---   Diligent skin cares per bedside RN            DVT Prophylaxis:  Apixaban  GI Prophylaxis:   PPI  Restraints:   Not Indicated  Family Communication:    updated at bedside  Code Status:   Full Code     Lines/tubes/drains:  ~ PIV  ~ PEG tube  ~ Chest tube, right  ~ Deleon catheter     Disposition:  Anticipate 3-5 days of hospitalization for chest tube removal and optimization of medical care            Althea Tesfaye MD  Hospitalist Service, GOLD TEAM [address] [place] [address]  Securely message with [employer] (more info)  Text page via [employer] Paging/Directory   See signed in provider for up to date coverage information  ______________________________________________________________________    Interval History   Nausea and vomiting better today  Denied SOB or CP  Tolerating tube feed via PEG better today    Physical Exam   Vital Signs: Temp: 98.4  F (36.9  C) Temp src: Oral BP: 130/65  Pulse: 81   Resp: 25 SpO2: 94 % O2 Device: None (Room air)    Weight: 175 lbs 4.25 oz  General: Cachectic, AAO X 3, NAD.  HEENT:  NC/AT, neck supple  CVS:  Irregularly irregular, no m/r/g.  Lungs:  Decrease BS anteriorly, no wheezing, no rhonchi, right CT present   Abd:  Soft, + bs, distended, PEG insertion site appears non-infected  Ext:  No c/c.  Lymph:  + edema.  Neuro:  Nonfocal.  Musculoskeletal: No calf tenderness to palpation.    Skin:  No rash.  Psychiatry:  Mood and affect appropriate.  :  Pancho lemon present        Data     I have personally reviewed the following data over the past 24 hrs:    7.2  \   8.9 (L)   / 452 (H)     134 (L) 92 (L) 27.4 (H) /  85   3.5 36 (H) 0.62 \       Imaging results reviewed over the past 24 hrs:   No results found for this or any previous visit (from the past 24 hour(s)).   Home

## 2024-07-09 NOTE — ED ADULT NURSE NOTE - NSFALLRSKINDICATORS_ED_ALL_ED
were then removed. The collagen was then deployed and a 30 second dwell time was performed to allow for expansion of the collagen. The delivery tools were then removed with adequate hemostasis.     The patient tolerated the procedure well with no acute complications recognized. The patient left the EP lab in stable condition, in normal sinus rhythm. Just prior to pulling shealths, the ICE catheter was used to obtain ultrasound images and revealed no evidence of pericardial effusion.     Ablation Data:  Total procedure time: 54 minutes  LA Volume: 173 cc     Baseline Intervals    QRS duration: 186 msec  WY interval: 193 msec  RR interval: 683 msec  AH interval: 81 msec  HV interval: 53 msec    EP Study    AV [place]: 510 msec  VA [place]: 330 msec    Complications: None    Summary:   1. Successful pulmonary vein isolation x4.  2. Successful LA posterior wall isolation.   3. Creation of a line of bidirectional block at the cavotricuspid isthmus.  4.         Comprehensive EP study.  5. Pt tolerated the procedure well.      Albert Bajwa MD, MS  Clinical Cardiac Electrophysiology  7/9/2024  12:12 PM   
no

## 2024-09-24 ENCOUNTER — APPOINTMENT (OUTPATIENT)
Dept: ANTEPARTUM | Facility: CLINIC | Age: 39
End: 2024-09-24
Payer: COMMERCIAL

## 2024-09-24 ENCOUNTER — ASOB RESULT (OUTPATIENT)
Age: 39
End: 2024-09-24

## 2024-09-24 PROCEDURE — 76830 TRANSVAGINAL US NON-OB: CPT

## 2024-09-24 PROCEDURE — 76856 US EXAM PELVIC COMPLETE: CPT | Mod: 59

## 2024-10-20 ENCOUNTER — NON-APPOINTMENT (OUTPATIENT)
Age: 39
End: 2024-10-20

## 2024-10-26 ENCOUNTER — INPATIENT (INPATIENT)
Facility: HOSPITAL | Age: 39
LOS: 3 days | Discharge: HOME | DRG: 139 | End: 2024-10-30
Attending: INTERNAL MEDICINE | Admitting: INTERNAL MEDICINE
Payer: COMMERCIAL

## 2024-10-26 VITALS — HEIGHT: 62 IN

## 2024-10-26 DIAGNOSIS — E03.9 HYPOTHYROIDISM, UNSPECIFIED: ICD-10-CM

## 2024-10-26 DIAGNOSIS — J18.9 PNEUMONIA, UNSPECIFIED ORGANISM: ICD-10-CM

## 2024-10-26 DIAGNOSIS — Z90.49 ACQUIRED ABSENCE OF OTHER SPECIFIED PARTS OF DIGESTIVE TRACT: Chronic | ICD-10-CM

## 2024-10-26 DIAGNOSIS — I10 ESSENTIAL (PRIMARY) HYPERTENSION: ICD-10-CM

## 2024-10-26 PROCEDURE — 93010 ELECTROCARDIOGRAM REPORT: CPT

## 2024-10-26 PROCEDURE — 99285 EMERGENCY DEPT VISIT HI MDM: CPT | Mod: 25

## 2024-10-26 RX ORDER — SODIUM CHLORIDE 9 MG/ML
1550 INJECTION, SOLUTION INTRAMUSCULAR; INTRAVENOUS; SUBCUTANEOUS ONCE
Refills: 0 | Status: COMPLETED | OUTPATIENT
Start: 2024-10-26 | End: 2024-10-26

## 2024-10-26 RX ORDER — IPRATROPIUM BROMIDE AND ALBUTEROL SULFATE .5; 2.5 MG/3ML; MG/3ML
3 SOLUTION RESPIRATORY (INHALATION) ONCE
Refills: 0 | Status: COMPLETED | OUTPATIENT
Start: 2024-10-26 | End: 2024-10-26

## 2024-10-26 RX ORDER — CEFTRIAXONE SODIUM 10 G
1000 VIAL (EA) INJECTION ONCE
Refills: 0 | Status: COMPLETED | OUTPATIENT
Start: 2024-10-26 | End: 2024-10-26

## 2024-10-26 NOTE — ED ADULT TRIAGE NOTE - CHIEF COMPLAINT QUOTE
Patient ambulatory to the ER c/o SOB and chest pain starting this evening. Patient reports having a cough for 10 days but today developed SOB, chest pain, fever today. TMAX 105. SPO2 in triage 91%. increased work of breathing noted. Last took tylenol at 1pm.

## 2024-10-26 NOTE — ED ADULT NURSE REASSESSMENT NOTE - NS ED NURSE REASSESS COMMENT FT1
Pt resting in bed, reports partial pain relief, IV fluids infusing well. Head of bed elevated, denies nausea, no episodes of vomiting. AO x 3 oriented to baseline, normal breathing pattern with no difficulty Negative Screen

## 2024-10-27 DIAGNOSIS — J18.9 PNEUMONIA, UNSPECIFIED ORGANISM: ICD-10-CM

## 2024-10-27 LAB
ADD ON TEST-SPECIMEN IN LAB: SIGNIFICANT CHANGE UP
ALBUMIN SERPL ELPH-MCNC: 3.3 G/DL — SIGNIFICANT CHANGE UP (ref 3.3–5)
ALP SERPL-CCNC: 97 U/L — SIGNIFICANT CHANGE UP (ref 40–120)
ALT FLD-CCNC: 54 U/L — SIGNIFICANT CHANGE UP (ref 12–78)
ANION GAP SERPL CALC-SCNC: 6 MMOL/L — SIGNIFICANT CHANGE UP (ref 5–17)
APPEARANCE UR: CLEAR — SIGNIFICANT CHANGE UP
APTT BLD: 36.1 SEC — HIGH (ref 24.5–35.6)
AST SERPL-CCNC: 32 U/L — SIGNIFICANT CHANGE UP (ref 15–37)
BASOPHILS # BLD AUTO: 0.12 K/UL — SIGNIFICANT CHANGE UP (ref 0–0.2)
BASOPHILS NFR BLD AUTO: 1 % — SIGNIFICANT CHANGE UP (ref 0–2)
BILIRUB SERPL-MCNC: 0.3 MG/DL — SIGNIFICANT CHANGE UP (ref 0.2–1.2)
BILIRUB UR-MCNC: NEGATIVE — SIGNIFICANT CHANGE UP
BUN SERPL-MCNC: 11 MG/DL — SIGNIFICANT CHANGE UP (ref 7–23)
CALCIUM SERPL-MCNC: 9.4 MG/DL — SIGNIFICANT CHANGE UP (ref 8.5–10.1)
CHLORIDE SERPL-SCNC: 107 MMOL/L — SIGNIFICANT CHANGE UP (ref 96–108)
CO2 SERPL-SCNC: 24 MMOL/L — SIGNIFICANT CHANGE UP (ref 22–31)
COLOR SPEC: YELLOW — SIGNIFICANT CHANGE UP
CREAT SERPL-MCNC: 0.77 MG/DL — SIGNIFICANT CHANGE UP (ref 0.5–1.3)
D DIMER BLD IA.RAPID-MCNC: 338 NG/ML DDU — HIGH
DIFF PNL FLD: NEGATIVE — SIGNIFICANT CHANGE UP
EGFR: 101 ML/MIN/1.73M2 — SIGNIFICANT CHANGE UP
EOSINOPHIL # BLD AUTO: 0.35 K/UL — SIGNIFICANT CHANGE UP (ref 0–0.5)
EOSINOPHIL NFR BLD AUTO: 3 % — SIGNIFICANT CHANGE UP (ref 0–6)
FLUAV AG NPH QL: SIGNIFICANT CHANGE UP
FLUBV AG NPH QL: SIGNIFICANT CHANGE UP
GLUCOSE SERPL-MCNC: 105 MG/DL — HIGH (ref 70–99)
GLUCOSE UR QL: NEGATIVE MG/DL — SIGNIFICANT CHANGE UP
HCG SERPL-ACNC: <1 MIU/ML — SIGNIFICANT CHANGE UP
HCT VFR BLD CALC: 34.8 % — SIGNIFICANT CHANGE UP (ref 34.5–45)
HGB BLD-MCNC: 11.5 G/DL — SIGNIFICANT CHANGE UP (ref 11.5–15.5)
INR BLD: 1.11 RATIO — SIGNIFICANT CHANGE UP (ref 0.85–1.16)
KETONES UR-MCNC: NEGATIVE MG/DL — SIGNIFICANT CHANGE UP
LACTATE SERPL-SCNC: 0.6 MMOL/L — LOW (ref 0.7–2)
LEUKOCYTE ESTERASE UR-ACNC: NEGATIVE — SIGNIFICANT CHANGE UP
LYMPHOCYTES # BLD AUTO: 1.17 K/UL — SIGNIFICANT CHANGE UP (ref 1–3.3)
LYMPHOCYTES # BLD AUTO: 10 % — LOW (ref 13–44)
MACROCYTES BLD QL: SLIGHT — SIGNIFICANT CHANGE UP
MANUAL SMEAR VERIFICATION: SIGNIFICANT CHANGE UP
MCHC RBC-ENTMCNC: 27.2 PG — SIGNIFICANT CHANGE UP (ref 27–34)
MCHC RBC-ENTMCNC: 33 GM/DL — SIGNIFICANT CHANGE UP (ref 32–36)
MCV RBC AUTO: 82.3 FL — SIGNIFICANT CHANGE UP (ref 80–100)
METAMYELOCYTES # FLD: 3 % — HIGH (ref 0–0)
MICROCYTES BLD QL: SLIGHT — SIGNIFICANT CHANGE UP
MONOCYTES # BLD AUTO: 0.47 K/UL — SIGNIFICANT CHANGE UP (ref 0–0.9)
MONOCYTES NFR BLD AUTO: 4 % — SIGNIFICANT CHANGE UP (ref 2–14)
MYELOCYTES NFR BLD: 2 % — HIGH (ref 0–0)
NEUTROPHILS # BLD AUTO: 8.79 K/UL — HIGH (ref 1.8–7.4)
NEUTROPHILS NFR BLD AUTO: 74 % — SIGNIFICANT CHANGE UP (ref 43–77)
NEUTS BAND # BLD: 1 % — SIGNIFICANT CHANGE UP (ref 0–8)
NITRITE UR-MCNC: NEGATIVE — SIGNIFICANT CHANGE UP
NRBC # BLD: 0 /100 WBCS — SIGNIFICANT CHANGE UP (ref 0–0)
NRBC # BLD: SIGNIFICANT CHANGE UP /100 WBCS (ref 0–0)
PH UR: 7.5 — SIGNIFICANT CHANGE UP (ref 5–8)
PLAT MORPH BLD: NORMAL — SIGNIFICANT CHANGE UP
PLATELET # BLD AUTO: 370 K/UL — SIGNIFICANT CHANGE UP (ref 150–400)
POLYCHROMASIA BLD QL SMEAR: SLIGHT — SIGNIFICANT CHANGE UP
POTASSIUM SERPL-MCNC: 4.1 MMOL/L — SIGNIFICANT CHANGE UP (ref 3.5–5.3)
POTASSIUM SERPL-SCNC: 4.1 MMOL/L — SIGNIFICANT CHANGE UP (ref 3.5–5.3)
PROMYELOCYTES # FLD: 2 % — HIGH (ref 0–0)
PROT SERPL-MCNC: 8.5 GM/DL — HIGH (ref 6–8.3)
PROT UR-MCNC: NEGATIVE MG/DL — SIGNIFICANT CHANGE UP
PROTHROM AB SERPL-ACNC: 12.8 SEC — SIGNIFICANT CHANGE UP (ref 9.9–13.4)
RBC # BLD: 4.23 M/UL — SIGNIFICANT CHANGE UP (ref 3.8–5.2)
RBC # FLD: 14.7 % — HIGH (ref 10.3–14.5)
RBC BLD AUTO: SIGNIFICANT CHANGE UP
RSV RNA NPH QL NAA+NON-PROBE: SIGNIFICANT CHANGE UP
SARS-COV-2 RNA SPEC QL NAA+PROBE: SIGNIFICANT CHANGE UP
SODIUM SERPL-SCNC: 137 MMOL/L — SIGNIFICANT CHANGE UP (ref 135–145)
SP GR SPEC: <1.005 — LOW (ref 1–1.03)
UROBILINOGEN FLD QL: 0.2 MG/DL — SIGNIFICANT CHANGE UP (ref 0.2–1)
WBC # BLD: 11.72 K/UL — HIGH (ref 3.8–10.5)
WBC # FLD AUTO: 11.72 K/UL — HIGH (ref 3.8–10.5)

## 2024-10-27 PROCEDURE — 93970 EXTREMITY STUDY: CPT | Mod: 26

## 2024-10-27 PROCEDURE — 71275 CT ANGIOGRAPHY CHEST: CPT | Mod: 26,MC

## 2024-10-27 PROCEDURE — 85027 COMPLETE CBC AUTOMATED: CPT

## 2024-10-27 PROCEDURE — 80048 BASIC METABOLIC PNL TOTAL CA: CPT

## 2024-10-27 PROCEDURE — 36415 COLL VENOUS BLD VENIPUNCTURE: CPT

## 2024-10-27 PROCEDURE — 71045 X-RAY EXAM CHEST 1 VIEW: CPT | Mod: 26

## 2024-10-27 PROCEDURE — 93970 EXTREMITY STUDY: CPT

## 2024-10-27 PROCEDURE — 99223 1ST HOSP IP/OBS HIGH 75: CPT

## 2024-10-27 RX ORDER — LABETALOL HCL 200 MG
1 TABLET ORAL
Refills: 0 | DISCHARGE

## 2024-10-27 RX ORDER — ACETAMINOPHEN 500 MG
650 TABLET ORAL EVERY 6 HOURS
Refills: 0 | Status: DISCONTINUED | OUTPATIENT
Start: 2024-10-27 | End: 2024-10-30

## 2024-10-27 RX ORDER — AZITHROMYCIN DIHYDRATE 200 MG/5ML
500 POWDER, FOR SUSPENSION ORAL ONCE
Refills: 0 | Status: COMPLETED | OUTPATIENT
Start: 2024-10-27 | End: 2024-10-27

## 2024-10-27 RX ORDER — MELATONIN 5 MG
3 TABLET ORAL AT BEDTIME
Refills: 0 | Status: DISCONTINUED | OUTPATIENT
Start: 2024-10-27 | End: 2024-10-30

## 2024-10-27 RX ORDER — LEVOTHYROXINE SODIUM 88 MCG
25 TABLET ORAL DAILY
Refills: 0 | Status: DISCONTINUED | OUTPATIENT
Start: 2024-10-27 | End: 2024-10-30

## 2024-10-27 RX ORDER — MAGNESIUM, ALUMINUM HYDROXIDE 200-200 MG
30 TABLET,CHEWABLE ORAL EVERY 4 HOURS
Refills: 0 | Status: DISCONTINUED | OUTPATIENT
Start: 2024-10-27 | End: 2024-10-30

## 2024-10-27 RX ORDER — ONDANSETRON HYDROCHLORIDE 2 MG/ML
4 INJECTION, SOLUTION INTRAMUSCULAR; INTRAVENOUS EVERY 6 HOURS
Refills: 0 | Status: DISCONTINUED | OUTPATIENT
Start: 2024-10-27 | End: 2024-10-30

## 2024-10-27 RX ORDER — DEXAMETHASONE 1.5 MG 1.5 MG/1
6 TABLET ORAL ONCE
Refills: 0 | Status: COMPLETED | OUTPATIENT
Start: 2024-10-27 | End: 2024-10-27

## 2024-10-27 RX ORDER — LEVOTHYROXINE SODIUM 88 MCG
1 TABLET ORAL
Refills: 0 | DISCHARGE

## 2024-10-27 RX ORDER — DOXYCYCLINE HYCLATE 100 MG/1
100 TABLET, FILM COATED ORAL EVERY 12 HOURS
Refills: 0 | Status: DISCONTINUED | OUTPATIENT
Start: 2024-10-27 | End: 2024-10-30

## 2024-10-27 RX ORDER — CEFTRIAXONE SODIUM 10 G
1000 VIAL (EA) INJECTION EVERY 24 HOURS
Refills: 0 | Status: DISCONTINUED | OUTPATIENT
Start: 2024-10-27 | End: 2024-10-30

## 2024-10-27 RX ORDER — LABETALOL HCL 200 MG
100 TABLET ORAL DAILY
Refills: 0 | Status: DISCONTINUED | OUTPATIENT
Start: 2024-10-27 | End: 2024-10-30

## 2024-10-27 RX ORDER — INFLUENZ VIR VAC TV P-SURF2003 15MCG/.5ML
0.5 SYRINGE (ML) INTRAMUSCULAR ONCE
Refills: 0 | Status: DISCONTINUED | OUTPATIENT
Start: 2024-10-27 | End: 2024-10-30

## 2024-10-27 RX ORDER — ENOXAPARIN SODIUM 40MG/0.4ML
40 SYRINGE (ML) SUBCUTANEOUS EVERY 24 HOURS
Refills: 0 | Status: DISCONTINUED | OUTPATIENT
Start: 2024-10-27 | End: 2024-10-30

## 2024-10-27 RX ADMIN — Medication 1000 MILLIGRAM(S): at 23:16

## 2024-10-27 RX ADMIN — Medication 100 MILLIGRAM(S): at 11:43

## 2024-10-27 RX ADMIN — IPRATROPIUM BROMIDE AND ALBUTEROL SULFATE 3 MILLILITER(S): .5; 2.5 SOLUTION RESPIRATORY (INHALATION) at 00:39

## 2024-10-27 RX ADMIN — Medication 25 MICROGRAM(S): at 11:43

## 2024-10-27 RX ADMIN — DEXAMETHASONE 1.5 MG 6 MILLIGRAM(S): 1.5 TABLET ORAL at 01:08

## 2024-10-27 RX ADMIN — Medication 1000 MILLIGRAM(S): at 00:40

## 2024-10-27 RX ADMIN — AZITHROMYCIN DIHYDRATE 255 MILLIGRAM(S): 200 POWDER, FOR SUSPENSION ORAL at 02:13

## 2024-10-27 RX ADMIN — SODIUM CHLORIDE 1550 MILLILITER(S): 9 INJECTION, SOLUTION INTRAMUSCULAR; INTRAVENOUS; SUBCUTANEOUS at 00:39

## 2024-10-27 RX ADMIN — DOXYCYCLINE HYCLATE 100 MILLIGRAM(S): 100 TABLET, FILM COATED ORAL at 21:41

## 2024-10-27 RX ADMIN — Medication 100 MILLIGRAM(S): at 21:41

## 2024-10-27 NOTE — ED ADULT NURSE REASSESSMENT NOTE - NS ED NURSE REASSESS COMMENT FT1
Report received from RN Laurie Tompkins. Pt seen at bedside. Vital signs recorded at shift change. Pt is to be admitted to [facility] pending bed assignment. Pt resting comfortably in bed at this time. No complaints at this time. Plan of care explained  phone used.

## 2024-10-27 NOTE — ED ADULT NURSE NOTE - NSFALLRISKINTERV_ED_ALL_ED
Assistance with ambulation/Communicate fall risk and risk factors to all staff, patient, and family/Provide visual cue: yellow wristband, yellow gown, etc/Reinforce activity limits and safety measures with patient and family/Call bell, personal items and telephone in reach/Instruct patient to call for assistance before getting out of bed/chair/stretcher/Non-slip footwear applied when patient is off stretcher/[place] to call system/Physically safe environment - no spills, clutter or unnecessary equipment/Purposeful Proactive Rounding/Room/bathroom lighting operational, light cord in reach

## 2024-10-27 NOTE — H&P ADULT - HISTORY OF PRESENT ILLNESS
39-year-old female with past medical history of HTN, hypothyroid presents to [facility] complaining of cough for approximately 10 days.  Reports of high fever, chills, cough which is productive in nature.  Reports of chest pain with coughing.  No other acute complaints.  No recent travel.  No known sick contacts  In ED,  Vitals: T: 99.2, HR: 92, BP: 138/85, RR: 19, SpO2: 95% on room air  Labs: BC: 11.72, dimer: 338, lactate 0.6  Imaging: CT head: Scattered patchy opacities of the right lower lobe with extensive consolidation, additional scattered patchy opacities in the right upper and middle lobe    In ED, patient received one-time dose of azithromycin and Rocephin and IV fluids

## 2024-10-27 NOTE — ED PROVIDER NOTE - OBJECTIVE STATEMENT
39-year-old female no pertinent past medical or surgical history presents for evaluation of cough for approximately 10 days progressively worsening with fever and a Tmax of 105 °F according to the patient a few days ago.  The patient notes some shortness of breath when taking deep breaths and the cough is productive of yellow sputum.  The patient was hypoxic on arrival to the emergency department with O2 sat of 91% on room air.  The patient does not have any history of smoking or vaping.  The patient was found to be mildly tachypneic after walking to the bathroom.  Will get septic workup to include lactate, blood cultures, administer IV ceftriaxone/azithromycin, administer DuoNeb, IV corticosteroid and admit.

## 2024-10-27 NOTE — H&P ADULT - NSHPPHYSICALEXAM_GEN_ALL_CORE
Vitals  T(F): 98.1 (10-27-24 @ 10:35), Max: 100.2 (10-27-24 @ 00:00)  HR: 80 (10-27-24 @ 10:35) (80 - 107)  BP: 120/73 (10-27-24 @ 10:35) (111/71 - 149/86)  RR: 20 (10-27-24 @ 10:35) (19 - 33)  SpO2: 96% (10-27-24 @ 10:35) (94% - 100%)    PHYSICAL EXAM   Gen: NAD, comfortable, AA&Ox4  HEENT: head atrumatic and normocephalic, PEERLA, EOMI,  no gross abnormalities of ears, mucous membranes moist, no oral lesions, neck supple without masses/goiter/lymphadenopathy, no JVD  CVS: +s1, s2, regular rate and rhythm, no murmurs, rubs or gallops, no thrill, normal PMI  Pulmonary: +crackles right side, no wheezing   Abdomen: soft, non-tender, non-distended, +bowel sounds in all 4 quadrants, no masses noted, no guarding or rigidity   Back: no scoliosis, lordosis, or kyphosis, no point tenderness, no CVA tenderness   Extremities: no pedal edema, pedal pulses palpable, <2 sec. cap refill   Skin: nl warm and dry, no wounds   Neuro: grossly non-focal

## 2024-10-27 NOTE — ED PROVIDER NOTE - DATE/TIME OF ACCEPTANCE
Patient notified of results and recommendations. Patient verbalized understanding.    26-Oct-2024 23:30

## 2024-10-27 NOTE — ED ADULT NURSE NOTE - OBJECTIVE STATEMENT
pt ambulatory to ED for SOB and cough for 10 days. pt was seen in the ED for same symptoms recently and was not diagnosed with anything. endorses fever of 105F on thursday has been taking tylenol at home, last dose was around 1pm yesterday. C/o mild chest pain, and difficulty breathing. pt denies any recent travel, n/v/d. O2 sat >95% on room air. RR >35 after ambulating to the bathroom. +FLORES. Placed on cardiac monitor. pt ambulatory to ED for SOB and cough for 10 days. pt was seen in the ED for same symptoms recently and was not diagnosed with anything. endorses fever of 105F on thursday has been taking tylenol at home, last dose was around 1pm yesterday. C/o mild chest pain, and difficulty breathing. pt denies any recent travel/sick contacts, n/v/d. O2 sat >95% on room air. RR >35 after ambulating to the bathroom. +FLORES. Placed on cardiac monitor. No acute distress noted. pt ambulatory to ED for SOB and cough for 10 days. pt was seen in the ED for same symptoms recently and was not diagnosed with anything. endorses fever of 105F on thursday has been taking tylenol at home, last dose was around 1pm yesterday. C/o mild chest pain, and difficulty breathing. pt denies any recent travel/sick contacts, n/v/d. O2 sat >95% on room air. RR >35 after ambulating to the bathroom. +FLORES. Placed on cardiac monitor. No acute distress noted.  used ID #[id number]

## 2024-10-27 NOTE — H&P ADULT - ASSESSMENT
39-year-old female with past medical history of HTN, hypothyroid presents to [facility] complaining of cough for approximately 10 days    #Community Acquired Pneumonia, unable to rule out if gram negative in etiology   -RVP: negative   -CTA: as above   -s/p Azithro and Rocephin   -c/w Doxy and Rocephin   -antitussives PRN     #Elevated Dimer   -CTA: negative for PE  -f/u duplex     #Hx of HTN and Hypothyroid   -c/w Labetalol and Synthroid     #VTE Prophylaxis   -Lovenox

## 2024-10-27 NOTE — ED ADULT NURSE NOTE - NURSING NEURO ORIENTATION
Writer left vm that patient's appt for 1/9 is cancelled and needs to call office to reschedule.    oriented to person, place and time

## 2024-10-27 NOTE — ED PROVIDER NOTE - PHYSICAL EXAMINATION
CONSTITUTIONAL: Well appearing, awake, alert, oriented to person, place, time/situation and in no apparent distress.  · ENMT: Airway patent, Nasal mucosa clear. Mouth with normal mucosa. Throat has no vesicles, no oropharyngeal exudates and uvula is midline.  · EYES: Clear bilaterally, pupils equal, round and reactive to light.  · CARDIAC: tachycardia,, regular rhythm.  Heart sounds S1, S2.  No murmurs, rubs or gallops.  · RESPIRATORY: Tachypnea, right basilar rhonchi  · GASTROINTESTINAL: Abdomen soft, non-tender, no guarding.  · MUSCULOSKELETAL: Spine appears normal, range of motion is not limited, no muscle or joint tenderness  · NEUROLOGICAL: Alert and oriented, no focal deficits, no motor or sensory deficits.  · SKIN: Skin normal color for race, warm, dry and intact. No evidence of rash

## 2024-10-28 LAB
ANION GAP SERPL CALC-SCNC: 6 MMOL/L — SIGNIFICANT CHANGE UP (ref 5–17)
BUN SERPL-MCNC: 15 MG/DL — SIGNIFICANT CHANGE UP (ref 7–23)
CALCIUM SERPL-MCNC: 9.6 MG/DL — SIGNIFICANT CHANGE UP (ref 8.5–10.1)
CHLORIDE SERPL-SCNC: 111 MMOL/L — HIGH (ref 96–108)
CO2 SERPL-SCNC: 25 MMOL/L — SIGNIFICANT CHANGE UP (ref 22–31)
CREAT SERPL-MCNC: 0.73 MG/DL — SIGNIFICANT CHANGE UP (ref 0.5–1.3)
EGFR: 108 ML/MIN/1.73M2 — SIGNIFICANT CHANGE UP
GLUCOSE SERPL-MCNC: 99 MG/DL — SIGNIFICANT CHANGE UP (ref 70–99)
HCT VFR BLD CALC: 35.3 % — SIGNIFICANT CHANGE UP (ref 34.5–45)
HGB BLD-MCNC: 11.4 G/DL — LOW (ref 11.5–15.5)
MCHC RBC-ENTMCNC: 26.6 PG — LOW (ref 27–34)
MCHC RBC-ENTMCNC: 32.3 GM/DL — SIGNIFICANT CHANGE UP (ref 32–36)
MCV RBC AUTO: 82.5 FL — SIGNIFICANT CHANGE UP (ref 80–100)
PLATELET # BLD AUTO: 440 K/UL — HIGH (ref 150–400)
POTASSIUM SERPL-MCNC: 4.1 MMOL/L — SIGNIFICANT CHANGE UP (ref 3.5–5.3)
POTASSIUM SERPL-SCNC: 4.1 MMOL/L — SIGNIFICANT CHANGE UP (ref 3.5–5.3)
RBC # BLD: 4.28 M/UL — SIGNIFICANT CHANGE UP (ref 3.8–5.2)
RBC # FLD: 14.8 % — HIGH (ref 10.3–14.5)
SODIUM SERPL-SCNC: 142 MMOL/L — SIGNIFICANT CHANGE UP (ref 135–145)
WBC # BLD: 12.76 K/UL — HIGH (ref 3.8–10.5)
WBC # FLD AUTO: 12.76 K/UL — HIGH (ref 3.8–10.5)

## 2024-10-28 PROCEDURE — 99233 SBSQ HOSP IP/OBS HIGH 50: CPT

## 2024-10-28 RX ADMIN — Medication 100 MILLIGRAM(S): at 09:34

## 2024-10-28 RX ADMIN — DOXYCYCLINE HYCLATE 100 MILLIGRAM(S): 100 TABLET, FILM COATED ORAL at 22:17

## 2024-10-28 RX ADMIN — Medication 3 MILLIGRAM(S): at 22:16

## 2024-10-28 RX ADMIN — Medication 100 MILLIGRAM(S): at 22:16

## 2024-10-28 RX ADMIN — Medication 100 MILLIGRAM(S): at 12:45

## 2024-10-28 RX ADMIN — Medication 1000 MILLIGRAM(S): at 22:17

## 2024-10-28 RX ADMIN — DOXYCYCLINE HYCLATE 100 MILLIGRAM(S): 100 TABLET, FILM COATED ORAL at 09:35

## 2024-10-28 RX ADMIN — Medication 100 MILLIGRAM(S): at 05:16

## 2024-10-28 RX ADMIN — Medication 25 MICROGRAM(S): at 05:16

## 2024-10-29 LAB
ANION GAP SERPL CALC-SCNC: 2 MMOL/L — LOW (ref 5–17)
BUN SERPL-MCNC: 19 MG/DL — SIGNIFICANT CHANGE UP (ref 7–23)
CALCIUM SERPL-MCNC: 9.5 MG/DL — SIGNIFICANT CHANGE UP (ref 8.5–10.1)
CHLORIDE SERPL-SCNC: 109 MMOL/L — HIGH (ref 96–108)
CO2 SERPL-SCNC: 28 MMOL/L — SIGNIFICANT CHANGE UP (ref 22–31)
CREAT SERPL-MCNC: 0.79 MG/DL — SIGNIFICANT CHANGE UP (ref 0.5–1.3)
EGFR: 98 ML/MIN/1.73M2 — SIGNIFICANT CHANGE UP
GLUCOSE SERPL-MCNC: 93 MG/DL — SIGNIFICANT CHANGE UP (ref 70–99)
HCT VFR BLD CALC: 37.4 % — SIGNIFICANT CHANGE UP (ref 34.5–45)
HGB BLD-MCNC: 12.4 G/DL — SIGNIFICANT CHANGE UP (ref 11.5–15.5)
MCHC RBC-ENTMCNC: 27.3 PG — SIGNIFICANT CHANGE UP (ref 27–34)
MCHC RBC-ENTMCNC: 33.2 GM/DL — SIGNIFICANT CHANGE UP (ref 32–36)
MCV RBC AUTO: 82.2 FL — SIGNIFICANT CHANGE UP (ref 80–100)
PLATELET # BLD AUTO: 473 K/UL — HIGH (ref 150–400)
POTASSIUM SERPL-MCNC: 4.1 MMOL/L — SIGNIFICANT CHANGE UP (ref 3.5–5.3)
POTASSIUM SERPL-SCNC: 4.1 MMOL/L — SIGNIFICANT CHANGE UP (ref 3.5–5.3)
RBC # BLD: 4.55 M/UL — SIGNIFICANT CHANGE UP (ref 3.8–5.2)
RBC # FLD: 14.8 % — HIGH (ref 10.3–14.5)
SODIUM SERPL-SCNC: 139 MMOL/L — SIGNIFICANT CHANGE UP (ref 135–145)
WBC # BLD: 10.49 K/UL — SIGNIFICANT CHANGE UP (ref 3.8–10.5)
WBC # FLD AUTO: 10.49 K/UL — SIGNIFICANT CHANGE UP (ref 3.8–10.5)

## 2024-10-29 PROCEDURE — 99232 SBSQ HOSP IP/OBS MODERATE 35: CPT

## 2024-10-29 RX ADMIN — Medication 3 MILLIGRAM(S): at 20:59

## 2024-10-29 RX ADMIN — Medication 25 MICROGRAM(S): at 05:44

## 2024-10-29 RX ADMIN — Medication 100 MILLIGRAM(S): at 15:26

## 2024-10-29 RX ADMIN — Medication 100 MILLIGRAM(S): at 10:24

## 2024-10-29 RX ADMIN — Medication 40 MILLIGRAM(S): at 10:24

## 2024-10-29 RX ADMIN — DOXYCYCLINE HYCLATE 100 MILLIGRAM(S): 100 TABLET, FILM COATED ORAL at 20:59

## 2024-10-29 RX ADMIN — Medication 1000 MILLIGRAM(S): at 21:02

## 2024-10-29 RX ADMIN — Medication 100 MILLIGRAM(S): at 05:43

## 2024-10-29 RX ADMIN — Medication 100 MILLIGRAM(S): at 20:59

## 2024-10-29 RX ADMIN — DOXYCYCLINE HYCLATE 100 MILLIGRAM(S): 100 TABLET, FILM COATED ORAL at 10:24

## 2024-10-30 ENCOUNTER — TRANSCRIPTION ENCOUNTER (OUTPATIENT)
Age: 39
End: 2024-10-30

## 2024-10-30 VITALS
RESPIRATION RATE: 17 BRPM | DIASTOLIC BLOOD PRESSURE: 84 MMHG | SYSTOLIC BLOOD PRESSURE: 128 MMHG | OXYGEN SATURATION: 96 % | TEMPERATURE: 98 F | HEART RATE: 65 BPM

## 2024-10-30 PROCEDURE — 99239 HOSP IP/OBS DSCHRG MGMT >30: CPT

## 2024-10-30 RX ORDER — CEFUROXIME AXETIL 250 MG
1 TABLET ORAL
Qty: 10 | Refills: 0
Start: 2024-10-30 | End: 2024-11-03

## 2024-10-30 RX ADMIN — Medication 100 MILLIGRAM(S): at 05:49

## 2024-10-30 RX ADMIN — DOXYCYCLINE HYCLATE 100 MILLIGRAM(S): 100 TABLET, FILM COATED ORAL at 09:36

## 2024-10-30 RX ADMIN — Medication 100 MILLIGRAM(S): at 09:36

## 2024-10-30 RX ADMIN — Medication 40 MILLIGRAM(S): at 09:34

## 2024-10-30 RX ADMIN — Medication 25 MICROGRAM(S): at 05:48

## 2024-10-30 NOTE — DISCHARGE NOTE PROVIDER - NSDCFUSCHEDAPPT_GEN_ALL_CORE_FT
[facility]ician [facility]  INTMED [address]  Scheduled Appointment: 11/12/2024    Kathrine Mitchell  [facility]  PULMMED [address]  Scheduled Appointment: 11/12/2024

## 2024-10-30 NOTE — DISCHARGE NOTE PROVIDER - HOSPITAL COURSE
39-year-old female with past medical history of HTN, hypothyroid presents to [facility] complaining of cough for approximately 10 days.  Reports of high fever, chills, cough which is productive in nature.  Reports of chest pain with coughing.  No other acute complaints.  No recent travel.  No known sick contacts  In ED,  Vitals: T: 99.2, HR: 92, BP: 138/85, RR: 19, SpO2: 95% on room air  Labs: BC: 11.72, dimer: 338, lactate 0.6  Imaging: CT head: Scattered patchy opacities of the right lower lobe with extensive consolidation, additional scattered patchy opacities in the right upper and middle lobe    Assessment/Plan:  39-year-old female with past medical history of HTN, hypothyroid presents to [facility] complaining of cough for approximately 10 days    #Community Acquired Pneumonia, unable to rule out if gram negative in etiology       #Elevated Dimer   - CTA: negative for PE  - duplex neg for dvt    #Hx of HTN and Hypothyroid   - c/w Labetalol and Synthroid            39-year-old female with past medical history of HTN, hypothyroid presents to [facility] complaining of cough for approximately 10 days.  Reports of high fever, chills, cough which is productive in nature.  Reports of chest pain with coughing.  No other acute complaints.  No recent travel.  No known sick contacts  In ED,  Vitals: T: 99.2, HR: 92, BP: 138/85, RR: 19, SpO2: 95% on room air  Labs: BC: 11.72, dimer: 338, lactate 0.6  Imaging: CT head: Scattered patchy opacities of the right lower lobe with extensive consolidation, additional scattered patchy opacities in the right upper and middle lobe    Assessment/Plan:  39-year-old female with past medical history of HTN, hypothyroid presents to [facility] complaining of cough for approximately 10 days    #Community Acquired Pneumonia, unable to rule out if gram negative in etiology   Ceftin and f/up CT or CXR in 6 weeks at [facility]    #Elevated Dimer   - CTA: negative for PE  - duplex neg for dvt    #Hx of HTN and Hypothyroid   - c/w Labetalol and Synthroid

## 2024-10-30 NOTE — DISCHARGE NOTE PROVIDER - CARE PROVIDER_API CALL
Mago Garduno  [address] [address]  Phone: [phone]  Fax: [phone]  Follow Up Time: 1 month

## 2024-10-30 NOTE — DISCHARGE NOTE PROVIDER - NSDCFUADDAPPT_GEN_ALL_CORE_FT
APPTS ARE READY TO BE MADE: [X] YES    Best Family or Patient Contact (if needed):    Additional Information about above appointments (if needed):    1:  2:  3:    APPTS ARE READY TO BE MADE: [X] YES    Best Family or Patient Contact (if needed):    Additional Information about above appointments (if needed):    1:  2:  3:     Prior to outreaching the patient, it was visible that the patient has secured a follow up appointment which was not scheduled by our team. [facility] with [facility] 11/12 9:00am [facility]

## 2024-10-30 NOTE — DISCHARGE NOTE PROVIDER - NSDCQMSTAIRS_GEN_ALL_CORE
[General Appearance - Alert] : alert [General Appearance - In No Acute Distress] : in no acute distress [Sclera] : the sclera and conjunctiva were normal [PERRL With Normal Accommodation] : pupils were equal in size, round, and reactive to light [Extraocular Movements] : extraocular movements were intact [Outer Ear] : the ears and nose were normal in appearance [Oropharynx] : the oropharynx was normal [Neck Appearance] : the appearance of the neck was normal [Neck Cervical Mass (___cm)] : no neck mass was observed [Jugular Venous Distention Increased] : there was no jugular-venous distention [Thyroid Diffuse Enlargement] : the thyroid was not enlarged [Thyroid Nodule] : there were no palpable thyroid nodules [Auscultation Breath Sounds / Voice Sounds] : lungs were clear to auscultation bilaterally [Heart Rate And Rhythm] : heart rate was normal and rhythm regular [Heart Sounds] : normal S1 and S2 [Heart Sounds Gallop] : no gallops [Murmurs] : no murmurs [Heart Sounds Pericardial Friction Rub] : no pericardial rub [Bowel Sounds] : normal bowel sounds [Abdomen Soft] : soft [Abdomen Tenderness] : non-tender [Abdomen Mass (___ Cm)] : no abdominal mass palpated [Abnormal Walk] : normal gait [Nail Clubbing] : no clubbing  or cyanosis of the fingernails [Involuntary Movements] : no involuntary movements were seen [Motor Tone] : muscle strength and tone were normal [] : no rash [Affect] : the affect was normal No [Mood] : the mood was normal [FreeTextEntry1] : No foot swelling or tenderness. Right DIPs with heberden nodes. RIght 3rd DIP tophi

## 2024-10-30 NOTE — DISCHARGE NOTE NURSING/CASE MANAGEMENT/SOCIAL WORK - NSDCPEFALRISK_GEN_ALL_CORE
For information on Fall & Injury Prevention, visit: [email or web address] OR  [email or web address] OR  https://www.cdc.gov/steadi/patient.html

## 2024-10-30 NOTE — PROGRESS NOTE ADULT - SUBJECTIVE AND OBJECTIVE BOX
CC: cough, fever      History of Present Illness:   39-year-old female with past medical history of HTN, hypothyroid presents to [facility] complaining of cough for approximately 10 days.  Reports of high fever, chills, cough which is productive in nature.  Reports of chest pain with coughing.  No other acute complaints.  No recent travel.  No known sick contacts  In ED,  Vitals: T: 99.2, HR: 92, BP: 138/85, RR: 19, SpO2: 95% on room air  Labs: BC: 11.72, dimer: 338, lactate 0.6  Imaging: CT head: Scattered patchy opacities of the right lower lobe with extensive consolidation, additional scattered patchy opacities in the right upper and middle lobe    In ED, patient received one-time dose of azithromycin and Rocephin and IV fluids      S:  10/28: Spoke to patient via  number [id number].  Pt with cough.  Discussed right side PNA and treatment plan with IV antibiotics.  Discussed plan of care and addressed all questions and concerns to the best of my ability.      REVIEW OF SYSTEMS: All other review of systems is negative unless indicated above.      Vital Signs Last 24 Hrs  T(C): 36.7 (28 Oct 2024 07:30), Max: 36.7 (28 Oct 2024 07:30)  T(F): 98 (28 Oct 2024 07:30), Max: 98 (28 Oct 2024 07:30)  HR: 75 (28 Oct 2024 07:30) (67 - 82)  BP: 130/84 (28 Oct 2024 07:30) (125/75 - 133/81)  BP(mean): 93 (27 Oct 2024 16:25) (93 - 93)  RR: 20 (27 Oct 2024 16:25) (20 - 20)  SpO2: 95% (28 Oct 2024 07:30) (93% - 95%)    Parameters below as of 28 Oct 2024 07:30  Patient On (Oxygen Delivery Method): room air        PHYSICAL EXAM:    Constitutional: NAD, awake and alert  HEENT: PERR, EOMI, Normal Hearing, MMM  Neck: Soft and supple  Respiratory: + right sided rhonchi  Cardiovascular: S1 and S2, regular rate and rhythm, no Murmurs, gallops or rubs  Gastrointestinal: Bowel Sounds present, soft, nontender, nondistended, no guarding, no rebound  Extremities: No peripheral edema  Neurological: A/O x 3, no focal deficits in my limited exam      MEDICATIONS  (STANDING):  benzonatate 100 milliGRAM(s) Oral every 8 hours  cefTRIAXone Injectable. 1000 milliGRAM(s) IV Push every 24 hours  doxycycline monohydrate Capsule 100 milliGRAM(s) Oral every 12 hours  enoxaparin Injectable 40 milliGRAM(s) SubCutaneous every 24 hours  influenza   Vaccine 0.5 milliLiter(s) IntraMuscular once  labetalol 100 milliGRAM(s) Oral daily  levothyroxine 25 MICROGram(s) Oral daily    MEDICATIONS  (PRN):  acetaminophen     Tablet .. 650 milliGRAM(s) Oral every 6 hours PRN Mild Pain (1 - 3)  aluminum hydroxide/magnesium hydroxide/simethicone Suspension 30 milliLiter(s) Oral every 4 hours PRN Dyspepsia  melatonin 3 milliGRAM(s) Oral at bedtime PRN Insomnia  ondansetron Injectable 4 milliGRAM(s) IV Push every 6 hours PRN Nausea and/or Vomiting                                11.4   12.76 )-----------( 440      ( 28 Oct 2024 08:46 )             35.3     10-28    142  |  111[H]  |  15  ----------------------------<  99  4.1   |  25  |  0.73    Ca    9.6      28 Oct 2024 08:46    TPro  8.5[H]  /  Alb  3.3  /  TBili  0.3  /  DBili  x   /  AST  32  /  ALT  54  /  AlkPhos  97  10-27    CAPILLARY BLOOD GLUCOSE        LIVER FUNCTIONS - ( 27 Oct 2024 00:27 )  Alb: 3.3 g/dL / Pro: 8.5 gm/dL / ALK PHOS: 97 U/L / ALT: 54 U/L / AST: 32 U/L / GGT: x           PT/INR - ( 27 Oct 2024 00:27 )   PT: 12.8 sec;   INR: 1.11 ratio         PTT - ( 27 Oct 2024 00:27 )  PTT:36.1 sec  Urinalysis Basic - ( 28 Oct 2024 08:46 )    Color: x / Appearance: x / SG: x / pH: x  Gluc: 99 mg/dL / Ketone: x  / Bili: x / Urobili: x   Blood: x / Protein: x / Nitrite: x   Leuk Esterase: x / RBC: x / WBC x   Sq Epi: x / Non Sq Epi: x / Bacteria: x        Assessment/Plan:  39-year-old female with past medical history of HTN, hypothyroid presents to [facility] complaining of cough for approximately 10 days    #Community Acquired Pneumonia, unable to rule out if gram negative in etiology   - BCx neg x 2  - RVP: negative   -CTA: right sided PNA   - s/p Azithro   - c/w Doxy and Rocephin   - antitussives PRN     #Elevated Dimer   - CTA: negative for PE  - duplex neg for dvt    #Hx of HTN and Hypothyroid   - c/w Labetalol and Synthroid     #VTE Prophylaxis   - Lovenox     
CC: cough, fever      History of Present Illness:   39-year-old female with past medical history of HTN, hypothyroid presents to [facility] complaining of cough for approximately 10 days.  Reports of high fever, chills, cough which is productive in nature.  Reports of chest pain with coughing.  No other acute complaints.  No recent travel.  No known sick contacts  In ED,  Vitals: T: 99.2, HR: 92, BP: 138/85, RR: 19, SpO2: 95% on room air  Labs: BC: 11.72, dimer: 338, lactate 0.6  Imaging: CT head: Scattered patchy opacities of the right lower lobe with extensive consolidation, additional scattered patchy opacities in the right upper and middle lobe    In ED, patient received one-time dose of azithromycin and Rocephin and IV fluids      S:  10/28: Spoke to patient via  number [id number].  Pt with cough.  Discussed right side PNA and treatment plan with IV antibiotics.  Discussed plan of care and addressed all questions and concerns to the best of my ability.    10/29: Spoke to  [id number], pt still with cough, + FLORES.  Plan to continue antibiotics.    REVIEW OF SYSTEMS: All other review of systems is negative unless indicated above.      Vital Signs Last 24 Hrs  T(C): 36.8 (29 Oct 2024 08:14), Max: 36.8 (29 Oct 2024 08:14)  T(F): 98.2 (29 Oct 2024 08:14), Max: 98.2 (29 Oct 2024 08:14)  HR: 64 (29 Oct 2024 08:14) (59 - 64)  BP: 130/85 (29 Oct 2024 08:14) (130/85 - 136/78)  BP(mean): --  RR: 18 (29 Oct 2024 08:14) (18 - 18)  SpO2: 95% (29 Oct 2024 08:14) (95% - 99%)    Parameters below as of 29 Oct 2024 08:14  Patient On (Oxygen Delivery Method): room air        PHYSICAL EXAM:    Constitutional: NAD, awake and alert  HEENT: PERR, EOMI, Normal Hearing, MMM  Neck: Soft and supple  Respiratory: + right sided rhonchi  Cardiovascular: S1 and S2, regular rate and rhythm, no Murmurs, gallops or rubs  Gastrointestinal: Bowel Sounds present, soft, nontender, nondistended, no guarding, no rebound  Extremities: No peripheral edema  Neurological: A/O x 3, no focal deficits in my limited exam      MEDICATIONS  (STANDING):  benzonatate 100 milliGRAM(s) Oral every 8 hours  cefTRIAXone Injectable. 1000 milliGRAM(s) IV Push every 24 hours  doxycycline monohydrate Capsule 100 milliGRAM(s) Oral every 12 hours  enoxaparin Injectable 40 milliGRAM(s) SubCutaneous every 24 hours  influenza   Vaccine 0.5 milliLiter(s) IntraMuscular once  labetalol 100 milliGRAM(s) Oral daily  levothyroxine 25 MICROGram(s) Oral daily    MEDICATIONS  (PRN):  acetaminophen     Tablet .. 650 milliGRAM(s) Oral every 6 hours PRN Mild Pain (1 - 3)  aluminum hydroxide/magnesium hydroxide/simethicone Suspension 30 milliLiter(s) Oral every 4 hours PRN Dyspepsia  melatonin 3 milliGRAM(s) Oral at bedtime PRN Insomnia  ondansetron Injectable 4 milliGRAM(s) IV Push every 6 hours PRN Nausea and/or Vomiting                                12.4   10.49 )-----------( 473      ( 29 Oct 2024 11:51 )             37.4     10-29    139  |  109[H]  |  19  ----------------------------<  93  4.1   |  28  |  0.79    Ca    9.5      29 Oct 2024 11:51      CAPILLARY BLOOD GLUCOSE            Urinalysis Basic - ( 29 Oct 2024 11:51 )    Color: x / Appearance: x / SG: x / pH: x  Gluc: 93 mg/dL / Ketone: x  / Bili: x / Urobili: x   Blood: x / Protein: x / Nitrite: x   Leuk Esterase: x / RBC: x / WBC x   Sq Epi: x / Non Sq Epi: x / Bacteria: x        Assessment/Plan:  39-year-old female with past medical history of HTN, hypothyroid presents to [facility] complaining of cough for approximately 10 days    #Community Acquired Pneumonia, unable to rule out if gram negative in etiology   - still with cough and FLORES  - leukocytosis resolved  - BCx neg x 2  - RVP: negative   -CTA: right sided PNA   - s/p Azithro   - c/w Doxy and Rocephin   - antitussives PRN     #Elevated Dimer   - CTA: negative for PE  - duplex neg for dvt    #Hx of HTN and Hypothyroid   - c/w Labetalol and Synthroid     #VTE Prophylaxis   - Lovenox       Dispo:  - d/c home once symptoms improve 
CC: cough, fever      History of Present Illness:   39-year-old female with past medical history of HTN, hypothyroid presents to [facility] complaining of cough for approximately 10 days.  Reports of high fever, chills, cough which is productive in nature.  Reports of chest pain with coughing.  No other acute complaints.  No recent travel.  No known sick contacts  In ED,  Vitals: T: 99.2, HR: 92, BP: 138/85, RR: 19, SpO2: 95% on room air  Labs: BC: 11.72, dimer: 338, lactate 0.6  Imaging: CT head: Scattered patchy opacities of the right lower lobe with extensive consolidation, additional scattered patchy opacities in the right upper and middle lobe    In ED, patient received one-time dose of azithromycin and Rocephin and IV fluids      Adm for PNA      Vital Signs Last 24 Hrs  T(C): 36.8 (29 Oct 2024 08:14), Max: 36.8 (29 Oct 2024 08:14)  T(F): 98.2 (29 Oct 2024 08:14), Max: 98.2 (29 Oct 2024 08:14)  HR: 64 (29 Oct 2024 08:14) (59 - 64)  BP: 130/85 (29 Oct 2024 08:14) (130/85 - 136/78)  BP(mean): --  RR: 18 (29 Oct 2024 08:14) (18 - 18)  SpO2: 95% (29 Oct 2024 08:14) (95% - 99%)    Parameters below as of 29 Oct 2024 08:14  Patient On (Oxygen Delivery Method): room air        PHYSICAL EXAM:    Constitutional: NAD, awake and alert  HEENT: PERR, EOMI, Normal Hearing, MMM  Neck: Soft and supple  Respiratory: + right sided rhonchi  Cardiovascular: S1 and S2, regular rate and rhythm, no Murmurs, gallops or rubs  Gastrointestinal: Bowel Sounds present, soft, nontender, nondistended, no guarding, no rebound  Extremities: No peripheral edema  Neurological: A/O x 3, no focal deficits in my limited exam                                  12.4   10.49 )-----------( 473      ( 29 Oct 2024 11:51 )             37.4     10-29    139  |  109[H]  |  19  ----------------------------<  93  4.1   |  28  |  0.79    Ca    9.5      29 Oct 2024 11:51            Assessment/Plan:  39-year-old female with past medical history of HTN, hypothyroid presents to [facility] complaining of cough for approximately 10 days    #Community Acquired Pneumonia, unable to rule out if gram negative in etiology   - still with cough and FLORES  - leukocytosis resolved  - BCx neg x 2  - RVP: negative   -CTA: right sided PNA   - s/p Azithro   - c/w Doxy and Rocephin   - antitussives PRN     #Elevated Dimer   - CTA: negative for PE  - duplex neg for dvt    #Hx of HTN and Hypothyroid   - c/w Labetalol and Synthroid     #VTE Prophylaxis   - Lovenox       Dispo:  - d/c home once symptoms improve     time spent 55 min

## 2024-10-30 NOTE — DISCHARGE NOTE PROVIDER - NSDCCPCAREPLAN_GEN_ALL_CORE_FT
PRINCIPAL DISCHARGE DIAGNOSIS  Diagnosis: Multifocal pneumonia  Assessment and Plan of Treatment: take antibiotic and f/up for repeat CXR or CT in 1 month as outpatient; very important you have a repeat study to make sure lungs are normal

## 2024-10-30 NOTE — DISCHARGE NOTE NURSING/CASE MANAGEMENT/SOCIAL WORK - FINANCIAL ASSISTANCE
[facility] provides services at a reduced cost to those who are determined to be eligible through [facility]’s financial assistance program. Information regarding [facility]’s financial assistance program can be found by going to [email or web address] or by calling [phone].

## 2024-10-30 NOTE — DISCHARGE NOTE NURSING/CASE MANAGEMENT/SOCIAL WORK - PATIENT PORTAL LINK FT
You can access the FollowMyHealth Patient Portal offered by [facility] by registering at the following website: [email or web address]. By joining [facility]’s FollowMyHealth portal, you will also be able to view your health information using other applications (apps) compatible with our system.

## 2024-10-30 NOTE — DISCHARGE NOTE NURSING/CASE MANAGEMENT/SOCIAL WORK - NSDCFUADDAPPT_GEN_ALL_CORE_FT
APPTS ARE READY TO BE MADE: [X] YES    Best Family or Patient Contact (if needed):    Additional Information about above appointments (if needed):    1:  2:  3:

## 2024-10-30 NOTE — DISCHARGE NOTE PROVIDER - NSDCMRMEDTOKEN_GEN_ALL_CORE_FT
benzonatate 100 mg oral capsule: 1 cap(s) orally every 8 hours as needed for  cough  cefuroxime 500 mg oral tablet: 1 tab(s) orally 2 times a day  labetalol 100 mg oral tablet: 1 tab(s) orally once a day  levothyroxine 25 mcg (0.025 mg) oral tablet: 1 tab(s) orally once a day

## 2024-11-12 ENCOUNTER — APPOINTMENT (OUTPATIENT)
Dept: PULMONOLOGY | Facility: CLINIC | Age: 39
End: 2024-11-12
Payer: COMMERCIAL

## 2024-11-12 ENCOUNTER — APPOINTMENT (OUTPATIENT)
Dept: INTERNAL MEDICINE | Facility: CLINIC | Age: 39
End: 2024-11-12
Payer: COMMERCIAL

## 2024-11-12 VITALS
DIASTOLIC BLOOD PRESSURE: 79 MMHG | HEART RATE: 86 BPM | SYSTOLIC BLOOD PRESSURE: 126 MMHG | OXYGEN SATURATION: 96 % | WEIGHT: 169 LBS | BODY MASS INDEX: 31.1 KG/M2 | TEMPERATURE: 98 F | HEIGHT: 62 IN | RESPIRATION RATE: 18 BRPM

## 2024-11-12 DIAGNOSIS — J18.9 PNEUMONIA, UNSPECIFIED ORGANISM: ICD-10-CM

## 2024-11-12 DIAGNOSIS — R91.8 OTHER NONSPECIFIC ABNORMAL FINDING OF LUNG FIELD: ICD-10-CM

## 2024-11-12 DIAGNOSIS — R91.1 SOLITARY PULMONARY NODULE: ICD-10-CM

## 2024-11-12 PROCEDURE — 94010 BREATHING CAPACITY TEST: CPT

## 2024-11-12 PROCEDURE — 94727 GAS DIL/WSHOT DETER LNG VOL: CPT

## 2024-11-12 PROCEDURE — 94729 DIFFUSING CAPACITY: CPT

## 2024-11-12 PROCEDURE — ZZZZZ: CPT

## 2024-11-12 PROCEDURE — 99204 OFFICE O/P NEW MOD 45 MIN: CPT | Mod: 25

## 2024-11-12 RX ORDER — LABETALOL HYDROCHLORIDE 300 MG/1
300 TABLET, FILM COATED ORAL
Refills: 0 | Status: ACTIVE | COMMUNITY

## 2024-11-12 RX ORDER — LEVOTHYROXINE SODIUM 0.03 MG/1
25 TABLET ORAL
Refills: 0 | Status: ACTIVE | COMMUNITY

## 2024-11-12 RX ORDER — MULTIVIT-MIN/IRON/FOLIC ACID/K 18-600-40
CAPSULE ORAL
Refills: 0 | Status: ACTIVE | COMMUNITY

## 2024-11-13 ENCOUNTER — NON-APPOINTMENT (OUTPATIENT)
Age: 39
End: 2024-11-13

## 2024-12-17 ENCOUNTER — APPOINTMENT (OUTPATIENT)
Dept: ANTEPARTUM | Facility: CLINIC | Age: 39
End: 2024-12-17

## 2025-01-14 ENCOUNTER — APPOINTMENT (OUTPATIENT)
Dept: ANTEPARTUM | Facility: CLINIC | Age: 40
End: 2025-01-14
Payer: COMMERCIAL

## 2025-01-14 ENCOUNTER — ASOB RESULT (OUTPATIENT)
Age: 40
End: 2025-01-14

## 2025-01-14 PROCEDURE — 76830 TRANSVAGINAL US NON-OB: CPT

## 2025-01-14 PROCEDURE — 76856 US EXAM PELVIC COMPLETE: CPT | Mod: 59

## 2025-01-15 ENCOUNTER — APPOINTMENT (OUTPATIENT)
Dept: PULMONOLOGY | Facility: CLINIC | Age: 40
End: 2025-01-15

## 2025-01-28 ENCOUNTER — OUTPATIENT (OUTPATIENT)
Dept: OUTPATIENT SERVICES | Facility: HOSPITAL | Age: 40
LOS: 1 days | End: 2025-01-28
Payer: MEDICAID

## 2025-01-28 ENCOUNTER — RESULT REVIEW (OUTPATIENT)
Age: 40
End: 2025-01-28

## 2025-01-28 ENCOUNTER — APPOINTMENT (OUTPATIENT)
Dept: CT IMAGING | Facility: CLINIC | Age: 40
End: 2025-01-28
Payer: COMMERCIAL

## 2025-01-28 DIAGNOSIS — Z90.49 ACQUIRED ABSENCE OF OTHER SPECIFIED PARTS OF DIGESTIVE TRACT: Chronic | ICD-10-CM

## 2025-01-28 DIAGNOSIS — J18.9 PNEUMONIA, UNSPECIFIED ORGANISM: ICD-10-CM

## 2025-01-28 DIAGNOSIS — Z00.8 ENCOUNTER FOR OTHER GENERAL EXAMINATION: ICD-10-CM

## 2025-01-28 PROCEDURE — 71250 CT THORAX DX C-: CPT | Mod: 26

## 2025-01-28 PROCEDURE — 71250 CT THORAX DX C-: CPT

## 2025-04-15 ENCOUNTER — ASOB RESULT (OUTPATIENT)
Age: 40
End: 2025-04-15

## 2025-04-15 ENCOUNTER — APPOINTMENT (OUTPATIENT)
Dept: ANTEPARTUM | Facility: CLINIC | Age: 40
End: 2025-04-15
Payer: MEDICAID

## 2025-04-15 PROCEDURE — 76830 TRANSVAGINAL US NON-OB: CPT

## 2025-04-15 PROCEDURE — 76856 US EXAM PELVIC COMPLETE: CPT | Mod: 59

## 2025-05-27 ENCOUNTER — NON-APPOINTMENT (OUTPATIENT)
Age: 40
End: 2025-05-27

## 2025-06-26 NOTE — ED STATDOCS - LANGUAGE ASSISTANCE NEEDED
For information on Fall & Injury Prevention, visit: [email or web address] OR  [email or web address] OR  [email or web address]://www.cdc.gov/steadi/patient.html No-Patient/Caregiver offered and refused free interpretation services.